# Patient Record
Sex: FEMALE | Race: WHITE | NOT HISPANIC OR LATINO | Employment: UNEMPLOYED | URBAN - METROPOLITAN AREA
[De-identification: names, ages, dates, MRNs, and addresses within clinical notes are randomized per-mention and may not be internally consistent; named-entity substitution may affect disease eponyms.]

---

## 2017-03-01 ENCOUNTER — ALLSCRIPTS OFFICE VISIT (OUTPATIENT)
Dept: OTHER | Facility: OTHER | Age: 27
End: 2017-03-01

## 2017-03-01 DIAGNOSIS — Z11.3 ENCOUNTER FOR SCREENING FOR INFECTIONS WITH PREDOMINANTLY SEXUAL MODE OF TRANSMISSION: ICD-10-CM

## 2017-03-01 LAB
BACTERIA UR QL AUTO: NEGATIVE
CLUE CELL (HISTORICAL): NEGATIVE
HYPHAL YEAST (HISTORICAL): POSITIVE
KOH PREP (HISTORICAL): NEGATIVE
PH UR STRIP.AUTO: NORMAL [PH]
TRICHOMONAS (HISTORICAL): NEGATIVE

## 2017-03-01 PROCEDURE — 87591 N.GONORRHOEAE DNA AMP PROB: CPT | Performed by: OBSTETRICS & GYNECOLOGY

## 2017-03-01 PROCEDURE — 87624 HPV HI-RISK TYP POOLED RSLT: CPT | Performed by: OBSTETRICS & GYNECOLOGY

## 2017-03-01 PROCEDURE — G0145 SCR C/V CYTO,THINLAYER,RESCR: HCPCS | Performed by: OBSTETRICS & GYNECOLOGY

## 2017-03-01 PROCEDURE — 87491 CHLMYD TRACH DNA AMP PROBE: CPT | Performed by: OBSTETRICS & GYNECOLOGY

## 2017-03-02 ENCOUNTER — LAB REQUISITION (OUTPATIENT)
Dept: LAB | Facility: HOSPITAL | Age: 27
End: 2017-03-02
Payer: COMMERCIAL

## 2017-03-02 ENCOUNTER — GENERIC CONVERSION - ENCOUNTER (OUTPATIENT)
Dept: OTHER | Facility: OTHER | Age: 27
End: 2017-03-02

## 2017-03-02 DIAGNOSIS — Z12.4 ENCOUNTER FOR SCREENING FOR MALIGNANT NEOPLASM OF CERVIX: ICD-10-CM

## 2017-03-02 DIAGNOSIS — Z11.3 ENCOUNTER FOR SCREENING FOR INFECTIONS WITH PREDOMINANTLY SEXUAL MODE OF TRANSMISSION: ICD-10-CM

## 2017-03-03 LAB
CHLAMYDIA DNA CVX QL NAA+PROBE: NORMAL
N GONORRHOEA DNA GENITAL QL NAA+PROBE: NORMAL

## 2017-03-06 ENCOUNTER — GENERIC CONVERSION - ENCOUNTER (OUTPATIENT)
Dept: OTHER | Facility: OTHER | Age: 27
End: 2017-03-06

## 2017-03-15 LAB
HPV RRNA GENITAL QL NAA+PROBE: ABNORMAL
LAB AP GYN PRIMARY INTERPRETATION: NORMAL
Lab: NORMAL
PATH INTERP SPEC-IMP: NORMAL

## 2017-03-17 ENCOUNTER — GENERIC CONVERSION - ENCOUNTER (OUTPATIENT)
Dept: OTHER | Facility: OTHER | Age: 27
End: 2017-03-17

## 2017-03-20 ENCOUNTER — GENERIC CONVERSION - ENCOUNTER (OUTPATIENT)
Dept: OTHER | Facility: OTHER | Age: 27
End: 2017-03-20

## 2017-04-07 ENCOUNTER — ALLSCRIPTS OFFICE VISIT (OUTPATIENT)
Dept: OTHER | Facility: OTHER | Age: 27
End: 2017-04-07

## 2017-04-07 ENCOUNTER — LAB REQUISITION (OUTPATIENT)
Dept: LAB | Facility: HOSPITAL | Age: 27
End: 2017-04-07
Payer: COMMERCIAL

## 2017-04-07 DIAGNOSIS — R87.610 ATYPICAL SQUAMOUS CELLS OF UNDETERMINED SIGNIFICANCE ON CYTOLOGIC SMEAR OF CERVIX (ASC-US): ICD-10-CM

## 2017-04-07 PROCEDURE — 88305 TISSUE EXAM BY PATHOLOGIST: CPT | Performed by: OBSTETRICS & GYNECOLOGY

## 2018-01-09 NOTE — MISCELLANEOUS
Message   Recorded as Task   Date: 03/04/2017 07:08 AM, Created By: Kayla Beckman   Task Name: Miscellaneous   Assigned To: [facility]   Regarding Patient: Chidi Gardiner, Status: Active   CommentShelby Public - 04 Mar 2017 7:08 AM     TASK CREATED  VERONA/chloé neg, please inform pt   Yemi Soria - 06 Mar 2017 9:08 AM     TASK EDITED  patient aware        Active Problems    1  Anxiety (300 00) (F41 9)   2  Birth control counseling (V25 09) (Z30 09)   3  Cannabis Use (305 20)   4  Cervical cancer screening (V76 2) (Z12 4)   5  Depression (311) (F32 9)   6  Disc degeneration, lumbar (722 52) (M51 36)   7  Encounter for gynecological examination (V72 31) (Z01 419)   8  Esophageal reflux (530 81) (K21 9)   9  Herniated nucleus pulposus, L4-5 (722 10) (M51 26)   10  IUD contraception (V45 51) (Z97 5)   11  Long term current use of opiate analgesic (V58 69) (Z79 891)   12  Lumbago With Sciatica (724 3)   13  Denied: History of Patient Education - Self-Examination Of Breasts   14  Sacral Radiculopathy At S1 (724 4)   15  Sacroiliitis (720 2) (M46 1)   16  Screening for STDs (sexually transmitted diseases) (V74 5) (Z11 3)   17  Secondary amenorrhea (626 0) (N91 1)   18  Vaginitis due to Candida albicans (112 1) (B37 3)    Current Meds   1  Fluconazole 200 MG Oral Tablet; TAKE 1 TABLET 1 TIME ONLY  May repeat in one week   if still symptomatic; Therapy: [id number] to (Evaluate:03Mar2017)  Requested for: 52POM0918; Last   Rx:01Mar2017 Ordered   2  Mirena (52 MG) 20 MCG/24HR Intrauterine Intrauterine Device Recorded    Allergies    1  No Known Drug Allergies    2  No Known Environmental Allergies   3   No Known Food Allergies    Signatures   Electronically signed by : Yuniel Del Cid, ; Mar  6 2017 10:31AM EST                       (Author)

## 2018-01-10 NOTE — MISCELLANEOUS
Message   Recorded as Task   Date: 03/01/2017 02:41 PM, Created By: Nirmal Rodriguez   Task Name: Miscellaneous   Assigned To: Danay Valdes   Regarding Patient: Shruthi Reynoso, Status: In Progress   CommentEstivenarsh Alanis - 01 Mar 2017 2:41 PM     TASK CREATED  Patient is interested in [place] [address] IUD removal with reinsertion  It was placed 3/8/12  She has a history of abnormal Pap smear  Pap was done today  If normal, we can arrange for removal with reinsertion soon  If abnormal Pap, would suggest colposcopy first to see what the abnormalities on the cervix  Then could arrange for IUD removal with reinsertion after that  Additionally, patient with anxiety disorder, did tell her that I could give her 0 5 mg Ativan prior to the insertion process in addition to ibuprofen  She left before I could give her that prescription   [facility] - 02 Mar 2017 8:18 AM     TASK EDITED  Iud insertion and removal would be covered under plan  No ded or oop  Lm for pt   [facility] - 02 Mar 2017 8:18 AM     TASK IN PROGRESS   [facility] - 58 Mar 2017 8:42 AM     TASK EDITED  Spoke to patient, she is aware of benefits  We will wait for pap results and then schedule her accordingly  She does wish to have new iud replaced at removal time  Active Problems    1  Anxiety (300 00) (F41 9)   2  Birth control counseling (V25 09) (Z30 09)   3  Cannabis Use (305 20)   4  Cervical cancer screening (V76 2) (Z12 4)   5  Depression (311) (F32 9)   6  Disc degeneration, lumbar (722 52) (M51 36)   7  Encounter for gynecological examination (V72 31) (Z01 419)   8  Esophageal reflux (530 81) (K21 9)   9  Herniated nucleus pulposus, L4-5 (722 10) (M51 26)   10  IUD contraception (V45 51) (Z97 5)   11  Long term current use of opiate analgesic (V58 69) (Z79 891)   12  Lumbago With Sciatica (724 3)   13  Denied: History of Patient Education - Self-Examination Of Breasts   14  Sacral Radiculopathy At S1 (724 4)   15   Sacroiliitis (720 2) (M46 1)   16  Screening for STDs (sexually transmitted diseases) (V74 5) (Z11 3)   17  Secondary amenorrhea (626 0) (N91 1)   18  Vaginitis due to Candida albicans (112 1) (B37 3)    Current Meds   1  Fluconazole 200 MG Oral Tablet; TAKE 1 TABLET 1 TIME ONLY  May repeat in one week   if still symptomatic; Therapy: [id number] to (Evaluate:03Mar2017)  Requested for: 38KYZ8568; Last   Rx:01Mar2017 Ordered   2  Mirena (52 MG) 20 MCG/24HR Intrauterine Intrauterine Device Recorded    Allergies    1  No Known Drug Allergies    2  No Known Environmental Allergies   3  No Known Food Allergies    Signatures   Electronically signed by :  Enola Gaucher, ; Mar  2 2017  8:43AM EST                       (Author)

## 2018-01-12 NOTE — MISCELLANEOUS
Message   Recorded as Task   Date: 03/16/2017 07:48 AM, Created By: Tien Riggins   Task Name: Miscellaneous   Assigned To: Victoria Harrison   Regarding Patient: Mauricio Washington, Status: In Progress   CommentOrterrell Pederson - 16 Mar 2017 7:48 AM     TASK CREATED  Pap with ASCUS, positive HPV  Needs colposcopy  Would suggest we proceed with colposcopy first and then we will deal with removal and reinsertion of Mirena after those findings are back  Bradley Castroa - 17 Mar 2017 10:47 AM     TASK IN PROGRESS   Joselyn Castro - 17 Mar 2017 10:55 AM     TASK REPLIED TO: Previously Assigned To [employer] had reception look for an apt for the colpo    pt already had her IUD appt    they may have to change that to the colpo  Isra Salmon - 17 Mar 2017 3:39 PM     TASK REPLIED TO: Previously Assigned To [employer], would do colposcopy first followed by IUD after that depending on colposcopy results  Active Problems    1  Anxiety (300 00) (F41 9)   2  Birth control counseling (V25 09) (Z30 09)   3  Cannabis Use (305 20)   4  Cervical cancer screening (V76 2) (Z12 4)   5  Depression (311) (F32 9)   6  Disc degeneration, lumbar (722 52) (M51 36)   7  Encounter for gynecological examination (V72 31) (Z01 419)   8  Esophageal reflux (530 81) (K21 9)   9  Herniated nucleus pulposus, L4-5 (722 10) (M51 26)   10  IUD contraception (V45 51) (Z97 5)   11  Long term current use of opiate analgesic (V58 69) (Z79 891)   12  Lumbago With Sciatica (724 3)   13  Denied: History of Patient Education - Self-Examination Of Breasts   14  Sacral Radiculopathy At S1 (724 4)   15  Sacroiliitis (720 2) (M46 1)   16  Screening for STDs (sexually transmitted diseases) (V74 5) (Z11 3)   17  Secondary amenorrhea (626 0) (N91 1)   18  Vaginitis due to Candida albicans (112 1) (B37 3)    Current Meds   1  Fluconazole 200 MG Oral Tablet; TAKE 1 TABLET 1 TIME ONLY   May repeat in one week   if still symptomatic; Therapy: 47TAU1803 to (Evaluate:03Mar2017)  Requested for: 75VVD5004; Last   Rx:01Mar2017 Ordered   2  LORazepam 0 5 MG Oral Tablet; TAKE 1 TABLET EVERY 6 HOURS AS NEEDED; Therapy: 24OIU0167 to (Evaluate:07Mar2017); Last Rx:06Mar2017 Ordered   3  Mirena (52 MG) 20 MCG/24HR Intrauterine Intrauterine Device Recorded    Allergies    1  No Known Drug Allergies    2  No Known Environmental Allergies   3   No Known Food Allergies    Signatures   Electronically signed by : Griselda Shiley, ; Mar 20 2017  8:36AM EST                       (Author)

## 2018-01-13 VITALS
SYSTOLIC BLOOD PRESSURE: 120 MMHG | HEIGHT: 60 IN | DIASTOLIC BLOOD PRESSURE: 66 MMHG | WEIGHT: 157.13 LBS | BODY MASS INDEX: 30.85 KG/M2

## 2018-01-14 VITALS
WEIGHT: 158.13 LBS | SYSTOLIC BLOOD PRESSURE: 120 MMHG | DIASTOLIC BLOOD PRESSURE: 66 MMHG | HEIGHT: 60 IN | BODY MASS INDEX: 31.05 KG/M2

## 2018-01-15 NOTE — MISCELLANEOUS
Message   Recorded as Task   Date: 03/16/2017 07:48 AM, Created By: Scar Fried   Task Name: Miscellaneous   Assigned To: Isra Virk   Regarding Patient: Jovanny Clancy, Status: In Progress   CommentLorri Reza - 16 Mar 2017 7:48 AM     TASK CREATED  Pap with ASCUS, positive HPV  Needs colposcopy  Would suggest we proceed with colposcopy first and then we will deal with removal and reinsertion of Mirena after those findings are back  Joselyn Castro - 17 Mar 2017 10:47 AM     TASK IN PROGRESS   Joselyn Castro - 17 Mar 2017 10:55 AM     TASK REPLIED TO: Previously Assigned To [employer] had reception look for an apt for the colpo    pt already had her IUD appt    they may have to change that to the colpo           Active Problems    1  Anxiety (300 00) (F41 9)   2  Birth control counseling (V25 09) (Z30 09)   3  Cannabis Use (305 20)   4  Cervical cancer screening (V76 2) (Z12 4)   5  Depression (311) (F32 9)   6  Disc degeneration, lumbar (722 52) (M51 36)   7  Encounter for gynecological examination (V72 31) (Z01 419)   8  Esophageal reflux (530 81) (K21 9)   9  Herniated nucleus pulposus, L4-5 (722 10) (M51 26)   10  IUD contraception (V45 51) (Z97 5)   11  Long term current use of opiate analgesic (V58 69) (Z79 891)   12  Lumbago With Sciatica (724 3)   13  Denied: History of Patient Education - Self-Examination Of Breasts   14  Sacral Radiculopathy At S1 (724 4)   15  Sacroiliitis (720 2) (M46 1)   16  Screening for STDs (sexually transmitted diseases) (V74 5) (Z11 3)   17  Secondary amenorrhea (626 0) (N91 1)   18  Vaginitis due to Candida albicans (112 1) (B37 3)    Current Meds   1  Fluconazole 200 MG Oral Tablet; TAKE 1 TABLET 1 TIME ONLY  May repeat in one week   if still symptomatic; Therapy: 96VDM1987 to (Evaluate:03Mar2017)  Requested for: 47UBU1610; Last   Rx:01Mar2017 Ordered   2  LORazepam 0 5 MG Oral Tablet; TAKE 1 TABLET EVERY 6 HOURS AS NEEDED;    Therapy: 93BMI3496 to (Evaluate:07Mar2017); Last Rx:06Mar2017 Ordered   3  Mirena (52 MG) 20 MCG/24HR Intrauterine Intrauterine Device Recorded    Allergies    1  No Known Drug Allergies    2  No Known Environmental Allergies   3   No Known Food Allergies    Signatures   Electronically signed by : Lemuel Mart, ; Mar 17 2017 10:55AM EST                       (Author)

## 2018-05-02 ENCOUNTER — PROCEDURE VISIT (OUTPATIENT)
Dept: OBGYN CLINIC | Facility: CLINIC | Age: 28
End: 2018-05-02
Payer: COMMERCIAL

## 2018-05-02 VITALS
SYSTOLIC BLOOD PRESSURE: 120 MMHG | HEIGHT: 60 IN | BODY MASS INDEX: 29.25 KG/M2 | WEIGHT: 149 LBS | DIASTOLIC BLOOD PRESSURE: 60 MMHG

## 2018-05-02 DIAGNOSIS — Z30.432 ENCOUNTER FOR IUD REMOVAL: ICD-10-CM

## 2018-05-02 DIAGNOSIS — N91.2 AMENORRHEA: Primary | ICD-10-CM

## 2018-05-02 DIAGNOSIS — Z30.430 ENCOUNTER FOR INSERTION OF MIRENA IUD: ICD-10-CM

## 2018-05-02 DIAGNOSIS — Z30.011 ORAL CONTRACEPTION INITIATION: ICD-10-CM

## 2018-05-02 PROBLEM — R87.810 ASCUS WITH POSITIVE HIGH RISK HPV CERVICAL: Status: ACTIVE | Noted: 2017-04-07

## 2018-05-02 PROBLEM — N91.1 SECONDARY AMENORRHEA: Status: ACTIVE | Noted: 2017-03-01

## 2018-05-02 PROBLEM — R87.610 ASCUS WITH POSITIVE HIGH RISK HPV CERVICAL: Status: ACTIVE | Noted: 2017-04-07

## 2018-05-02 LAB — SL AMB POCT URINE HCG: NEGATIVE

## 2018-05-02 PROCEDURE — 99213 OFFICE O/P EST LOW 20 MIN: CPT | Performed by: OBSTETRICS & GYNECOLOGY

## 2018-05-02 PROCEDURE — 81025 URINE PREGNANCY TEST: CPT | Performed by: OBSTETRICS & GYNECOLOGY

## 2018-05-02 PROCEDURE — 58301 REMOVE INTRAUTERINE DEVICE: CPT | Performed by: OBSTETRICS & GYNECOLOGY

## 2018-05-02 RX ORDER — NORETHINDRONE ACETATE AND ETHINYL ESTRADIOL 1; .02 MG/1; MG/1
1 TABLET ORAL DAILY
Qty: 21 TABLET | Refills: 3 | Status: SHIPPED | OUTPATIENT
Start: 2018-05-02 | End: 2020-06-11

## 2018-05-02 RX ORDER — PEDI MULTIVIT NO.91/IRON FUM 15 MG
1 TABLET,CHEWABLE ORAL
COMMUNITY
End: 2020-06-11

## 2018-05-02 RX ORDER — ZINC GLUCONATE 50 MG
50 TABLET ORAL
COMMUNITY
End: 2020-06-11

## 2018-05-02 NOTE — PROGRESS NOTES
Order(s) created erroneously   Erroneous order ID: [id number]   Order canceled by: Velasquez Moura   Order cancel date/time: 05/02/2018 4:42 PM

## 2018-05-02 NOTE — PROGRESS NOTES
Iud removal  Date/Time: 2018 4:33 PM  Performed by: Sonali Aldrich  Authorized by: Sonali Aldrich     Consent:     Consent obtained:  Verbal and written    Consent given by:  Patient    Procedure risks and benefits discussed: yes      Patient questions answered: yes      Patient agrees, verbalizes understanding, and wants to proceed: yes      Educational handouts given: yes      Instructions and paperwork completed: yes    Procedure:     Removed with no complications: yes      Other reason for removal:   IUD  Comments:      Patient with incredible anxiety during the entire process    Between myself and her boyfriend, we were able to come her down to allow the speculum insertion and prompt IUD removal

## 2018-05-02 NOTE — PROGRESS NOTES
Assessment/Plan:  1  Mirena IUD removal-done without problems  Of note, the IUD was placed 3/8/12  Urine pregnancy test today was negative  The patient had planned Mirena IUD Re insertion, but she declined to do it today secondary to concerns about pain  She will start OCP instead  2   Contraception-patient wished to start OCP  She was on OCP previously without issues  The patient was counseled about risks of birth control pills  She denies any contraindications, including risks of blood clots, liver disease, hormone dependent tumors, or hypertension  ACHES symptoms were discussed  All questions were answered  The patient will start the pill as directed, and return in 3 months time for pill check  She does note migraine headaches, but denies any visual changes or neurologic changes  She is aware of potential risk of vascular complications such as stroke related to combined OCP  Electronic prescription for Loestrin 1/20, 1 pack to be refilled at 21 day intervals for continuous use was sent a local pharmacy, refill 3  She will follow up in 2-3 months time for pill check  3   History of Pap with ASCUS/positive HPV-patient had follow-up colposcopy with changes suggestive of HPV  She will follow up in the near future for yearly exam with Pap test   We could do pill check at that time depending on the timing  4   History of yeast infection-resolved  5  STD concerns-patient with new partner since January 2018  She declines STD testing at this time  6   Secondary amenorrhea-related to Mirena IUD  She will hopes to have secondary amenorrhea related to continuous OCP use going forward  7  Anxiety-patient with incredible levels of anxiety even related to IUD removal     Visit greater than 30 minutes duration above IUD removal, with greater than 50% of time spent counseling and coordinating care   She will follow up in the near future for yearly exam with Pap        No problem-specific Assessment & Plan notes found for this encounter  Diagnoses and all orders for this visit:    Amenorrhea  -     POCT urine HCG    Encounter for insertion of mirena IUD  -     POCT urine HCG    Encounter for IUD removal    Oral contraception initiation  -     norethindrone-ethinyl estradiol (MICROGESTIN 1/20) 1-20 MG-MCG per tablet; Take 1 tablet by mouth daily Patient taking continuously, please refill at 21 day intervals    Other orders  -     ascorbic acid (VITAMIN C) 250 MG CHEW; Chew 1 tablet  -     VITAMIN B COMPLEX-C PO; Take 1 tablet by mouth  -     Cholecalciferol 1000 units CHEW; Chew 1 tablet  -     levonorgestrel (MIRENA) 20 MCG/24HR IUD; 1 each by Intrauterine route  -     pediatric multivitamin-iron (POLY-VI-SOL with IRON) 15 MG chewable tablet; Chew 1 tablet  -     zinc gluconate 50 mg tablet; Take 50 mg by mouth          Subjective:      Patient ID: Willy Ford is a 32 y o  female  Patient was seen today for IUD removal   She had planned IUD Re insertion but changed her mind at the time of the visit  The following portions of the patient's history were reviewed and updated as appropriate: allergies, current medications, past family history, past medical history, past social history, past surgical history and problem list     Review of Systems   Constitutional: Negative for chills, diaphoresis, fatigue and fever  Respiratory: Negative for apnea, cough, chest tightness, shortness of breath and wheezing  Cardiovascular: Negative for chest pain, palpitations and leg swelling  Gastrointestinal: Negative for abdominal distention, abdominal pain, anal bleeding, constipation, diarrhea, nausea, rectal pain and vomiting  Genitourinary: Negative for difficulty urinating, dyspareunia, dysuria, frequency, hematuria, menstrual problem, pelvic pain, urgency, vaginal bleeding, vaginal discharge and vaginal pain  Musculoskeletal: Negative for arthralgias, back pain and myalgias     Skin: Negative for color change and rash  Neurological: Negative for dizziness, syncope, light-headedness, numbness and headaches  Hematological: Negative for adenopathy  Does not bruise/bleed easily  Psychiatric/Behavioral: Negative for dysphoric mood and sleep disturbance  The patient is not nervous/anxious  Objective:      /60 (BP Location: Left arm, Patient Position: Sitting, Cuff Size: Standard)   Ht 5' (1 524 m)   Wt 67 6 kg (149 lb)   BMI 29 10 kg/m²          Physical Exam    Objective      /60 (BP Location: Left arm, Patient Position: Sitting, Cuff Size: Standard)   Ht 5' (1 524 m)   Wt 67 6 kg (149 lb)   BMI 29 10 kg/m²     General:   alert and oriented, in no acute distress, alert, appears stated age and cooperative   Neck: normal to inspection and palpation   Breast:    Heart:    Lungs:    Abdomen: soft, non-tender, without masses or organomegaly   Vulva: normal   Vagina: Without lesions or discharge noted  No erythema noted   Cervix: Without lesions or discharge or cervicitis  No Cervical motion tenderness    IUD string was seen at a length of 2 cm and IUD was removed without problems as documented   Uterus: top normal size, anteverted, non-tender   Adnexa: no mass, fullness, tenderness   Rectum: deferred

## 2018-09-15 DIAGNOSIS — Z30.011 ORAL CONTRACEPTION INITIATION: ICD-10-CM

## 2018-09-17 RX ORDER — NORETHINDRONE ACETATE AND ETHINYL ESTRADIOL 1; 20 MG/1; UG/1
1 TABLET ORAL DAILY
Qty: 21 TABLET | Refills: 2 | OUTPATIENT
Start: 2018-09-17

## 2019-10-20 ENCOUNTER — HOSPITAL ENCOUNTER (EMERGENCY)
Facility: HOSPITAL | Age: 29
Discharge: HOME | End: 2019-10-20
Attending: EMERGENCY MEDICINE
Payer: COMMERCIAL

## 2019-10-20 VITALS
HEART RATE: 72 BPM | BODY MASS INDEX: 34.18 KG/M2 | RESPIRATION RATE: 14 BRPM | OXYGEN SATURATION: 99 % | SYSTOLIC BLOOD PRESSURE: 97 MMHG | WEIGHT: 175 LBS | TEMPERATURE: 97.4 F | DIASTOLIC BLOOD PRESSURE: 61 MMHG

## 2019-10-20 DIAGNOSIS — F15.10 METHAMPHETAMINE ABUSE (HCC): ICD-10-CM

## 2019-10-20 DIAGNOSIS — T40.1X1A ACCIDENTAL OVERDOSE OF HEROIN, INITIAL ENCOUNTER (HCC): Primary | ICD-10-CM

## 2019-10-20 LAB
ATRIAL RATE: 258 BPM
QRS AXIS: 78 DEGREES
QRSD INTERVAL: 88 MS
QT INTERVAL: 358 MS
QTC INTERVAL: 449 MS
T WAVE AXIS: 3 DEGREES
VENTRICULAR RATE: 95 BPM

## 2019-10-20 PROCEDURE — 99285 EMERGENCY DEPT VISIT HI MDM: CPT

## 2019-10-20 PROCEDURE — 93010 ELECTROCARDIOGRAM REPORT: CPT | Performed by: INTERNAL MEDICINE

## 2019-10-20 PROCEDURE — 96372 THER/PROPH/DIAG INJ SC/IM: CPT

## 2019-10-20 PROCEDURE — 96374 THER/PROPH/DIAG INJ IV PUSH: CPT

## 2019-10-20 PROCEDURE — 99284 EMERGENCY DEPT VISIT MOD MDM: CPT | Performed by: EMERGENCY MEDICINE

## 2019-10-20 PROCEDURE — 93005 ELECTROCARDIOGRAM TRACING: CPT

## 2019-10-20 RX ORDER — HALOPERIDOL 5 MG/ML
5 INJECTION INTRAMUSCULAR ONCE
Status: COMPLETED | OUTPATIENT
Start: 2019-10-20 | End: 2019-10-20

## 2019-10-20 RX ORDER — ONDANSETRON 2 MG/ML
4 INJECTION INTRAMUSCULAR; INTRAVENOUS ONCE
Status: COMPLETED | OUTPATIENT
Start: 2019-10-20 | End: 2019-10-20

## 2019-10-20 RX ORDER — ONDANSETRON 2 MG/ML
INJECTION INTRAMUSCULAR; INTRAVENOUS
Status: COMPLETED
Start: 2019-10-20 | End: 2019-10-20

## 2019-10-20 RX ORDER — ONDANSETRON 2 MG/ML
1 INJECTION INTRAMUSCULAR; INTRAVENOUS ONCE
Status: COMPLETED | OUTPATIENT
Start: 2019-10-20 | End: 2019-10-20

## 2019-10-20 RX ADMIN — ONDANSETRON 4 MG: 2 INJECTION INTRAMUSCULAR; INTRAVENOUS at 08:59

## 2019-10-20 RX ADMIN — HALOPERIDOL LACTATE 5 MG: 5 INJECTION, SOLUTION INTRAMUSCULAR at 10:01

## 2019-10-20 NOTE — ED ATTENDING ATTESTATION
10/20/2019  I, Monique Matos MD, saw and evaluated the patient  I have discussed the patient with the resident/non-physician practitioner and agree with the resident's/non-physician practitioner's findings, Plan of Care, and MDM as documented in the resident's/non-physician practitioner's note, except where noted  All available labs and Radiology studies were reviewed  I was present for key portions of any procedure(s) performed by the resident/non-physician practitioner and I was immediately available to provide assistance  At this point I agree with the current assessment done in the Emergency Department    I have conducted an independent evaluation of this patient a history and physical is as follows:  Snorted heroin and meth   Ems called no narcan given    No c/o pain no fever no ha    Has nausea and vomiting   No abdominal pain no headache  Patient is not suicidal or homicidal  Exam:  Vital signs are stable HEENT exam normocephalic atraumatic pupils are round react to light sclerae anicteric lungs clear heart regular abdomen soft nontender extremities nontender neurologic exam moves all extremities purposely face is symmetric awake alert oriented x3  Impression heroin ingestion with man them phentermine ingestion  ED Course         Critical Care Time  Procedures

## 2019-10-20 NOTE — ED PROVIDER NOTES
History  Chief Complaint   Patient presents with    Heroin Overdose - Accidental     OD on snorting heroin  also admits to using meth  pt found unresponsive in park  Pt woke up on her own when EMS arrived (no narcan needed)  HPI   49-year-old woman presents to the emergency department after heroin overdose  Patient was found in a park unresponsive  By time of EMS arrival patient was awake and alert  No Narcan was given  Patient admits to snorting heroin and methamphetamine  Denies injecting drugs  She has a history of drug abuse, says she was clean for several months but recently started using again  Denies ingesting any other substances including alcohol, acetaminophen, or aspirin  Denies this was an attempt to harm herself  States that she did purchase this heroin from a new dealer and believes this may be why she overdosed  She is unable to quantify the exact amount of heroin and meth that she has used  She denies any pain or trauma  Currently her only complaint is nausea and jitteryness  Prior to Admission Medications   Prescriptions Last Dose Informant Patient Reported? Taking?    Cholecalciferol 1000 units CHEW   Yes Yes   Sig: Chew 1 tablet   VITAMIN B COMPLEX-C PO   Yes Yes   Sig: Take 1 tablet by mouth   ascorbic acid (VITAMIN C) 250 MG CHEW   Yes Yes   Sig: Chew 1 tablet   levonorgestrel (MIRENA) 20 MCG/24HR IUD   Yes Yes   Si each by Intrauterine route   norethindrone-ethinyl estradiol (MICROGESTIN ) 1-20 MG-MCG per tablet   No Yes   Sig: Take 1 tablet by mouth daily Patient taking continuously, please refill at 21 day intervals   pediatric multivitamin-iron (POLY-VI-SOL with IRON) 15 MG chewable tablet   Yes Yes   Sig: Chew 1 tablet   zinc gluconate 50 mg tablet   Yes Yes   Sig: Take 50 mg by mouth      Facility-Administered Medications: None       Past Medical History:   Diagnosis Date    Depression     Fetal macrosomia     Human papilloma virus (HPV) infection     Last Assessed: 2013    Mild cervical dysplasia     Pap Smear (+) Low Grade Squamous Intraepithelial Lesion 2013    with colposcopy Dec  2013 with negative biopsies except for parakeratosis    Postcoital bleeding     Last Assessed: 2013       Past Surgical History:   Procedure Laterality Date     SECTION  2010    primary low transverse  for arrest of dilation at term on 7/9/10  Patient had live male Apgars  with weight 9 lbs  1 oz   COLPOSCOPY W/ BIOPSY / CURETTAGE  2013    12/3/13 patient with colposcopy wtih ECCs ad biopsies at 12  All biopsies were negative with parakeratosis at 12       Family History   Problem Relation Age of Onset    Cancer Family     Diabetes Family     Hypertension Family     Thyroid disease Family         Disorder    Other Family         Back pain     I have reviewed and agree with the history as documented  Social History     Tobacco Use    Smoking status: Current Every Day Smoker     Packs/day: 1 00     Types: Cigarettes    Smokeless tobacco: Never Used   Substance Use Topics    Alcohol use: Yes     Comment: social drinker per [employer]    Drug use: Yes     Types: Heroin, Methamphetamines        Review of Systems   Constitutional: Negative for chills and fever  Respiratory: Negative for shortness of breath  Cardiovascular: Negative for chest pain  Gastrointestinal: Positive for nausea and vomiting  Negative for abdominal pain  Psychiatric/Behavioral: Positive for agitation  Negative for suicidal ideas  All other systems reviewed and are negative        Physical Exam  ED Triage Vitals   Temperature Pulse Respirations Blood Pressure SpO2   10/20/19 0924 10/20/19 0849 10/20/19 0849 10/20/19 0854 10/20/19 0849   (!) 94 9 °F (34 9 °C) 102 18 143/64 98 %      Temp Source Heart Rate Source Patient Position - Orthostatic VS BP Location FiO2 (%)   10/20/19 0924 10/20/19 0849 10/20/19 1330 10/20/19 1330 --   Rectal Monitor Lying Right arm       Pain Score       10/20/19 0849       No Pain             Orthostatic Vital Signs  Vitals:    10/20/19 1230 10/20/19 1300 10/20/19 1330 10/20/19 1400   BP: 94/55 94/52 96/51 97/61   Pulse: 80 74 74 72   Patient Position - Orthostatic VS:   Lying        Physical Exam   Constitutional: She is oriented to person, place, and time  She appears well-developed and well-nourished  No distress  Shaky, crying  HENT:   Head: Normocephalic and atraumatic  Eyes: Pupils are equal, round, and reactive to light  Conjunctivae and EOM are normal  No scleral icterus  Neck: Normal range of motion  Neck supple  Cardiovascular: Regular rhythm  Exam reveals no gallop and no friction rub  No murmur heard  Tachycardic  Pulmonary/Chest: Breath sounds normal  She has no wheezes  She has no rales  Mouth breathing  Abdominal: Soft  She exhibits no distension  There is no tenderness  There is no rebound and no guarding  Musculoskeletal: Normal range of motion  She exhibits no edema or tenderness  Neurological: She is alert and oriented to person, place, and time  No cranial nerve deficit or sensory deficit  She exhibits normal muscle tone  Skin: Skin is warm and dry  She is not diaphoretic  No erythema  No pallor  Psychiatric:   Anxious  Difficult to console  Nursing note and vitals reviewed        ED Medications  Medications   ondansetron (FOR EMS ONLY) (ZOFRAN) 4 mg/2 mL injection 4 mg (0 mg Does not apply Given to EMS 10/20/19 0859)   ondansetron (ZOFRAN) injection 4 mg (4 mg Intravenous Given 10/20/19 0859)   haloperidol lactate (HALDOL) injection 5 mg (5 mg Intramuscular Given 10/20/19 1001)       Diagnostic Studies  Results Reviewed     None                 No orders to display         Procedures  ECG 12 Lead Documentation Only  Date/Time: 10/20/2019 8:53 AM  Performed by: Kassandra Munguia MD  Authorized by: Kassandra Munguia MD     Indications / Diagnosis:  Overdose  ECG reviewed by me, the ED Provider: yes    Patient location:  ED  Previous ECG:     Previous ECG:  Unavailable    Comparison to cardiac monitor: Yes    Interpretation:     Interpretation: non-specific    Quality:     Tracing quality:  Limited by artifact  Rate:     ECG rate:  95    ECG rate assessment: normal    Rhythm:     Rhythm: sinus rhythm    Ectopy:     Ectopy: none    QRS:     QRS axis:  Normal    QRS intervals:  Normal  Conduction:     Conduction: normal    ST segments:     ST segments:  Non-specific  T waves:     T waves: non-specific    Comments:      Heavy artifact especially in limb leads  ED Course  ED Course as of Oct 20 1609   Sun Oct 20, 2019   1030 Left message with HOST      1340 Reassessed patient and she is able to have conversation  Will have HOST come back to speak with patient  MDM  Number of Diagnoses or Management Options  Accidental overdose of heroin, initial encounter [facility]): new and requires workup  Methamphetamine abuse [facility]): new and requires workup     Amount and/or Complexity of Data Reviewed  Discuss the patient with other providers: yes  Independent visualization of images, tracings, or specimens: yes    Patient Progress  Patient progress: resolved     24-year-old woman presents after heroin and methamphetamine overdose  Patient is awake and alert, protecting airway  She does have nausea and vomiting  Will give antiemetics  Will reassess when sober  Patient reassessed and continues to have shakiness, nausea, and vomiting  She was given 5 mg of IM Haldol  Discussed patient with HOST, who came and evaluated her in the emergency department  After their arrival patient says she already has outpatient resources for substance abuse and denies any further needs at this point  Patient reassessed again and is doing well  Nausea and vomiting have resolved  She is at baseline mental status    She has spoken with her father, who will be picking her up from the emergency department  Encouraged patient to follow up with outpatient providers regarding her drug abuse  Disposition  Final diagnoses:   Accidental overdose of heroin, initial encounter [facility])   Methamphetamine abuse ([facility] [address] )     Time reflects when diagnosis was documented in both MDM as applicable and the Disposition within this note     Time User Action Codes Description Comment    10/20/2019  9:21 AM Marlen Doe Add [T40 1X1A] Accidental overdose of heroin, initial encounter ([facility] [address] )     10/20/2019  9:21 AM Marlen Zavaletaus Add [F15 10] Methamphetamine abuse [facility])       ED Disposition     ED Disposition Condition Date/Time Comment    Discharge Good Sun Oct 20, 2019  3:09 PM Astrid Ramirez discharge to home/self care              Follow-up Information     Follow up With Specialties Details Why Contact Info Additional Information    Felix Chopra MD Family Medicine Schedule an appointment as soon as possible for a visit   [address] Emergency Department Emergency Medicine  As needed [address] [place] [address], [address] [place] [address], [facility]   [phone]          Discharge Medication List as of 10/20/2019  3:24 PM      CONTINUE these medications which have NOT CHANGED    Details   ascorbic acid (VITAMIN C) 250 MG CHEW Chew 1 tablet, Historical Med      Cholecalciferol 1000 units CHEW Chew 1 tablet, Historical Med      levonorgestrel (MIRENA) 20 MCG/24HR IUD 1 each by Intrauterine route, Historical Med      norethindrone-ethinyl estradiol (MICROGESTIN 1/20) 1-20 MG-MCG per tablet Take 1 tablet by mouth daily Patient taking continuously, please refill at 21 day intervals, Starting Wed 5/2/2018, Normal      pediatric multivitamin-iron (POLY-VI-SOL with IRON) 15 MG chewable tablet Chew 1 tablet, Historical Med      VITAMIN B COMPLEX-C PO Take 1 tablet by mouth, Historical Med zinc gluconate 50 mg tablet Take 50 mg by mouth, Historical Med           No discharge procedures on file  ED Provider  Attending physically available and evaluated Shwetha Lucas I managed the patient along with the ED Attending      Electronically Signed by         Sharyle Lora, MD  10/20/19 3645

## 2019-10-20 NOTE — ED NOTES
Patient told HOST she does not want rehab, she states she has outpatient resources and that she wants to go home  I went in to speak with the patient myself and she told me the same       Kojo Masters RN  10/20/19 4823

## 2020-05-06 ENCOUNTER — HOSPITAL ENCOUNTER (EMERGENCY)
Facility: HOSPITAL | Age: 30
Discharge: HOME | End: 2020-05-06
Attending: EMERGENCY MEDICINE | Admitting: EMERGENCY MEDICINE
Payer: COMMERCIAL

## 2020-05-06 VITALS
DIASTOLIC BLOOD PRESSURE: 72 MMHG | HEART RATE: 76 BPM | RESPIRATION RATE: 20 BRPM | SYSTOLIC BLOOD PRESSURE: 120 MMHG | BODY MASS INDEX: 37.11 KG/M2 | OXYGEN SATURATION: 98 % | TEMPERATURE: 97.8 F | WEIGHT: 190 LBS

## 2020-05-06 DIAGNOSIS — N72 CERVICITIS: Primary | ICD-10-CM

## 2020-05-06 LAB
BACTERIA UR QL AUTO: ABNORMAL /HPF
BILIRUB UR QL STRIP: NEGATIVE
CLARITY UR: ABNORMAL
COLOR UR: YELLOW
EXT PREG TEST URINE: NEGATIVE
EXT. CONTROL ED NAV: NORMAL
GLUCOSE UR STRIP-MCNC: NEGATIVE MG/DL
HGB UR QL STRIP.AUTO: NEGATIVE
KETONES UR STRIP-MCNC: NEGATIVE MG/DL
LEUKOCYTE ESTERASE UR QL STRIP: ABNORMAL
NITRITE UR QL STRIP: NEGATIVE
NON-SQ EPI CELLS URNS QL MICRO: ABNORMAL /HPF
PH UR STRIP.AUTO: 6.5 [PH]
PROT UR STRIP-MCNC: NEGATIVE MG/DL
RBC #/AREA URNS AUTO: ABNORMAL /HPF
SP GR UR STRIP.AUTO: 1.02 (ref 1–1.03)
UROBILINOGEN UR QL STRIP.AUTO: 0.2 E.U./DL
WBC #/AREA URNS AUTO: ABNORMAL /HPF

## 2020-05-06 PROCEDURE — 81025 URINE PREGNANCY TEST: CPT | Performed by: EMERGENCY MEDICINE

## 2020-05-06 PROCEDURE — 99283 EMERGENCY DEPT VISIT LOW MDM: CPT

## 2020-05-06 PROCEDURE — 81001 URINALYSIS AUTO W/SCOPE: CPT | Performed by: EMERGENCY MEDICINE

## 2020-05-06 PROCEDURE — 87186 SC STD MICRODIL/AGAR DIL: CPT | Performed by: EMERGENCY MEDICINE

## 2020-05-06 PROCEDURE — 87077 CULTURE AEROBIC IDENTIFY: CPT | Performed by: EMERGENCY MEDICINE

## 2020-05-06 PROCEDURE — 96372 THER/PROPH/DIAG INJ SC/IM: CPT

## 2020-05-06 PROCEDURE — 99284 EMERGENCY DEPT VISIT MOD MDM: CPT | Performed by: EMERGENCY MEDICINE

## 2020-05-06 PROCEDURE — 87086 URINE CULTURE/COLONY COUNT: CPT | Performed by: EMERGENCY MEDICINE

## 2020-05-06 PROCEDURE — 87591 N.GONORRHOEAE DNA AMP PROB: CPT | Performed by: EMERGENCY MEDICINE

## 2020-05-06 PROCEDURE — 87491 CHLMYD TRACH DNA AMP PROBE: CPT | Performed by: EMERGENCY MEDICINE

## 2020-05-06 RX ORDER — AZITHROMYCIN 250 MG/1
1000 TABLET, FILM COATED ORAL ONCE
Status: COMPLETED | OUTPATIENT
Start: 2020-05-06 | End: 2020-05-06

## 2020-05-06 RX ORDER — DOXYCYCLINE HYCLATE 100 MG/1
100 CAPSULE ORAL 2 TIMES DAILY
Qty: 20 CAPSULE | Refills: 0 | Status: SHIPPED | OUTPATIENT
Start: 2020-05-06 | End: 2020-05-16

## 2020-05-06 RX ORDER — DOXYCYCLINE HYCLATE 100 MG/1
100 CAPSULE ORAL ONCE
Status: COMPLETED | OUTPATIENT
Start: 2020-05-06 | End: 2020-05-06

## 2020-05-06 RX ADMIN — DOXYCYCLINE 100 MG: 100 CAPSULE ORAL at 23:00

## 2020-05-06 RX ADMIN — AZITHROMYCIN 1000 MG: 250 TABLET, FILM COATED ORAL at 23:00

## 2020-05-06 RX ADMIN — WATER 250 MG: 1 INJECTION INTRAMUSCULAR; INTRAVENOUS; SUBCUTANEOUS at 23:01

## 2020-05-08 LAB
C TRACH DNA SPEC QL NAA+PROBE: NEGATIVE
N GONORRHOEA DNA SPEC QL NAA+PROBE: NEGATIVE

## 2020-05-09 ENCOUNTER — TELEPHONE (OUTPATIENT)
Dept: EMERGENCY DEPT | Facility: HOSPITAL | Age: 30
End: 2020-05-09

## 2020-05-09 DIAGNOSIS — N39.0 UTI (URINARY TRACT INFECTION): Primary | ICD-10-CM

## 2020-05-09 LAB
BACTERIA UR CULT: ABNORMAL
BACTERIA UR CULT: ABNORMAL

## 2020-05-09 RX ORDER — CEPHALEXIN 500 MG/1
500 CAPSULE ORAL EVERY 12 HOURS SCHEDULED
Qty: 20 CAPSULE | Refills: 0 | Status: SHIPPED | OUTPATIENT
Start: 2020-05-09 | End: 2020-05-19

## 2020-06-11 ENCOUNTER — OFFICE VISIT (OUTPATIENT)
Dept: URGENT CARE | Facility: CLINIC | Age: 30
End: 2020-06-11
Payer: COMMERCIAL

## 2020-06-11 VITALS
HEART RATE: 66 BPM | WEIGHT: 170 LBS | TEMPERATURE: 99.9 F | OXYGEN SATURATION: 99 % | RESPIRATION RATE: 16 BRPM | HEIGHT: 60 IN | SYSTOLIC BLOOD PRESSURE: 136 MMHG | DIASTOLIC BLOOD PRESSURE: 66 MMHG | BODY MASS INDEX: 33.38 KG/M2

## 2020-06-11 DIAGNOSIS — L03.811 CELLULITIS OF HEAD EXCEPT FACE: Primary | ICD-10-CM

## 2020-06-11 PROCEDURE — G0382 LEV 3 HOSP TYPE B ED VISIT: HCPCS | Performed by: PHYSICIAN ASSISTANT

## 2020-06-11 PROCEDURE — 99203 OFFICE O/P NEW LOW 30 MIN: CPT | Performed by: PHYSICIAN ASSISTANT

## 2020-06-11 PROCEDURE — 99283 EMERGENCY DEPT VISIT LOW MDM: CPT | Performed by: PHYSICIAN ASSISTANT

## 2020-06-11 RX ORDER — CEPHALEXIN 500 MG/1
500 CAPSULE ORAL EVERY 12 HOURS SCHEDULED
Qty: 14 CAPSULE | Refills: 0 | Status: SHIPPED | OUTPATIENT
Start: 2020-06-11 | End: 2020-06-18

## 2020-06-11 RX ORDER — BUPRENORPHINE HYDROCHLORIDE AND NALOXONE HYDROCHLORIDE DIHYDRATE 2; .5 MG/1; MG/1
1 TABLET SUBLINGUAL 2 TIMES DAILY
COMMUNITY
End: 2020-10-23 | Stop reason: ALTCHOICE

## 2020-06-11 RX ORDER — VENLAFAXINE 75 MG/1
75 TABLET ORAL 2 TIMES DAILY
COMMUNITY
End: 2020-10-23 | Stop reason: ALTCHOICE

## 2020-06-12 ENCOUNTER — ANNUAL EXAM (OUTPATIENT)
Dept: OBGYN CLINIC | Facility: CLINIC | Age: 30
End: 2020-06-12
Payer: COMMERCIAL

## 2020-06-12 VITALS
HEIGHT: 60 IN | SYSTOLIC BLOOD PRESSURE: 140 MMHG | DIASTOLIC BLOOD PRESSURE: 80 MMHG | WEIGHT: 183 LBS | BODY MASS INDEX: 35.93 KG/M2

## 2020-06-12 DIAGNOSIS — Z30.011 ENCOUNTER FOR INITIAL PRESCRIPTION OF CONTRACEPTIVE PILLS: ICD-10-CM

## 2020-06-12 DIAGNOSIS — Z30.09 BIRTH CONTROL COUNSELING: ICD-10-CM

## 2020-06-12 DIAGNOSIS — L03.811 CELLULITIS OF HEAD EXCEPT FACE: ICD-10-CM

## 2020-06-12 DIAGNOSIS — R87.810 ASCUS WITH POSITIVE HIGH RISK HPV CERVICAL: Primary | ICD-10-CM

## 2020-06-12 DIAGNOSIS — R87.610 ASCUS WITH POSITIVE HIGH RISK HPV CERVICAL: Primary | ICD-10-CM

## 2020-06-12 PROCEDURE — 87624 HPV HI-RISK TYP POOLED RSLT: CPT | Performed by: OBSTETRICS & GYNECOLOGY

## 2020-06-12 PROCEDURE — G0145 SCR C/V CYTO,THINLAYER,RESCR: HCPCS | Performed by: OBSTETRICS & GYNECOLOGY

## 2020-06-12 PROCEDURE — 99214 OFFICE O/P EST MOD 30 MIN: CPT | Performed by: OBSTETRICS & GYNECOLOGY

## 2020-06-12 RX ORDER — NORETHINDRONE ACETATE AND ETHINYL ESTRADIOL AND FERROUS FUMARATE 1MG-20(24)
1 KIT ORAL DAILY
Qty: 28 TABLET | Refills: 4 | Status: SHIPPED | OUTPATIENT
Start: 2020-06-12 | End: 2020-10-23 | Stop reason: ALTCHOICE

## 2020-06-18 LAB
HPV HR 12 DNA CVX QL NAA+PROBE: NEGATIVE
HPV16 DNA CVX QL NAA+PROBE: NEGATIVE
HPV18 DNA CVX QL NAA+PROBE: NEGATIVE

## 2020-06-23 DIAGNOSIS — Z30.011 ENCOUNTER FOR INITIAL PRESCRIPTION OF CONTRACEPTIVE PILLS: ICD-10-CM

## 2020-06-23 LAB
LAB AP GYN PRIMARY INTERPRETATION: NORMAL
Lab: NORMAL

## 2020-06-23 RX ORDER — NORETHINDRONE ACETATE AND ETHINYL ESTRADIOL AND FERROUS FUMARATE 1MG-20(24)
KIT ORAL
Qty: 28 TABLET | Refills: 4 | OUTPATIENT
Start: 2020-06-23

## 2020-06-23 NOTE — TELEPHONE ENCOUNTER
Please check with patient/pharmacy to see if this prescription is okay  As far as I know, the prescriber is not

## 2020-06-26 ENCOUNTER — TELEPHONE (OUTPATIENT)
Dept: OBGYN CLINIC | Facility: CLINIC | Age: 30
End: 2020-06-26

## 2020-06-26 DIAGNOSIS — Z30.011 ENCOUNTER FOR INITIAL PRESCRIPTION OF CONTRACEPTIVE PILLS: Primary | ICD-10-CM

## 2020-06-26 RX ORDER — NORETHINDRONE ACETATE AND ETHINYL ESTRADIOL 1; .02 MG/1; MG/1
1 TABLET ORAL DAILY
Qty: 28 TABLET | Refills: 3 | Status: SHIPPED | OUTPATIENT
Start: 2020-06-26 | End: 2020-10-23 | Stop reason: ALTCHOICE

## 2020-07-10 ENCOUNTER — TELEPHONE (OUTPATIENT)
Dept: OBGYN CLINIC | Facility: CLINIC | Age: 30
End: 2020-07-10

## 2020-07-10 NOTE — TELEPHONE ENCOUNTER
Per Dr Anusha Fisher tried to call patient to check whether or not she got her BCP Rx  Letter from insurance states was not approved  Patient did not answer  Voicemail is full and could not leave a message

## 2020-08-26 ENCOUNTER — OFFICE VISIT (OUTPATIENT)
Dept: OBGYN CLINIC | Facility: CLINIC | Age: 30
End: 2020-08-26
Payer: COMMERCIAL

## 2020-08-26 VITALS
HEIGHT: 60 IN | BODY MASS INDEX: 36.52 KG/M2 | WEIGHT: 186 LBS | SYSTOLIC BLOOD PRESSURE: 130 MMHG | TEMPERATURE: 97.6 F | DIASTOLIC BLOOD PRESSURE: 70 MMHG

## 2020-08-26 DIAGNOSIS — Z86.59 HISTORY OF ANXIETY: ICD-10-CM

## 2020-08-26 DIAGNOSIS — N91.2 AMENORRHEA: ICD-10-CM

## 2020-08-26 DIAGNOSIS — Z33.1 INCIDENTAL PREGNANCY: Primary | ICD-10-CM

## 2020-08-26 DIAGNOSIS — Z72.0 TOBACCO ABUSE: ICD-10-CM

## 2020-08-26 DIAGNOSIS — F19.11 HISTORY OF DRUG ABUSE (HCC): ICD-10-CM

## 2020-08-26 DIAGNOSIS — Z98.891 HISTORY OF CESAREAN SECTION: ICD-10-CM

## 2020-08-26 LAB — SL AMB POCT URINE HCG: POSITIVE

## 2020-08-26 PROCEDURE — 81025 URINE PREGNANCY TEST: CPT | Performed by: OBSTETRICS & GYNECOLOGY

## 2020-08-26 PROCEDURE — 99214 OFFICE O/P EST MOD 30 MIN: CPT | Performed by: OBSTETRICS & GYNECOLOGY

## 2020-08-26 RX ORDER — PNV NO.95/FERROUS FUM/FOLIC AC 28MG-0.8MG
1 TABLET ORAL DAILY
Qty: 90 TABLET | Refills: 4 | Status: SHIPPED | OUTPATIENT
Start: 2020-08-26

## 2020-08-26 NOTE — PROGRESS NOTES
Assessment/Plan:    Diagnoses and all orders for this visit:    Incidental pregnancy  -     Obstetric panel; Future  -     hCG, quantitative; Future  -     Progesterone; Future  -     US OB < 14 weeks single or first gestation level 1; Future  -     Prenatal Vit-Fe Fumarate-FA (Prenatal Vitamin) 27-0 8 MG TABS; Take 1 capsule by mouth daily    Amenorrhea  -     POCT urine HCG  -     Progesterone; Future  -     TSH, 3rd generation with Free T4 reflex; Future    History of  section    History of drug abuse ([facility] [address] )    History of anxiety    Tobacco abuse        Subjective:  Pregnancy confirmation     Patient ID: Jhoan Lang is a 34 y o  female  HPI   59-year-old female  2 para 1 positive home pregnancy test FD LMP was 2020, EGA 6 weeks and 2 days [facility] 2021  Her 1st pregnancy ended  section due to arrest of dilatation and descent  She is considering elective repeat  section at this time  Presently the patient feels well denies nausea or vomiting symptoms  She does admit to smoking at least 1 pack of cigarettes per day  We did discuss cutting down, weaning down and hopefully stopping soon  She was advised if she has difficulty doing this we may substitute nicotine patch and or counseling as indicated  She does have a history of IV drug abuse meth and heroin  Was on Suboxone recently stopped has been clean for several months  Was also on Effexor stopped on her own  States she is doing well for now she does have a counselor who she sees weekly  She was told if need be she might get back on Suboxone if indicated  Also there are medications which are acceptable in pregnancy also if needed  Will schedule dating pelvic ultrasound within the next 2 weeks along with her nurse interview  Screening laboratory studies were ordered today recommend she get them done at her earliest convenience    Otherwise follow-up within the next 3-4 weeks for her prenatal physical exam   All questions answered  Review of Systems   Respiratory: Negative  Cardiovascular: Negative  Gastrointestinal: Negative  Genitourinary: Negative  Neurological: Negative  Psychiatric/Behavioral: Negative  All other systems reviewed and are negative  Objective:  No acute distress  /70 (BP Location: Left arm, Patient Position: Sitting, Cuff Size: Standard)   Temp 97 6 °F (36 4 °C)   Ht 5' (1 524 m)   Wt 84 4 kg (186 lb)   LMP 07/13/2020   BMI 36 33 kg/m²      Physical Exam  Vitals signs reviewed  HENT:      Head: Normocephalic  Eyes:      Pupils: Pupils are equal, round, and reactive to light  Pulmonary:      Effort: Pulmonary effort is normal    Genitourinary:     Comments: Pelvic exam deferred  Musculoskeletal: Normal range of motion  Neurological:      Mental Status: She is oriented to person, place, and time     Psychiatric:         Behavior: Behavior normal

## 2020-09-10 ENCOUNTER — CLINICAL SUPPORT (OUTPATIENT)
Dept: OBGYN CLINIC | Facility: CLINIC | Age: 30
End: 2020-09-10
Payer: COMMERCIAL

## 2020-09-10 ENCOUNTER — ULTRASOUND (OUTPATIENT)
Dept: OBGYN CLINIC | Facility: CLINIC | Age: 30
End: 2020-09-10
Payer: COMMERCIAL

## 2020-09-10 DIAGNOSIS — Z34.90 NORMAL PREGNANCY, ANTEPARTUM: Primary | ICD-10-CM

## 2020-09-10 DIAGNOSIS — Z36.9 ANTENATAL SCREENING ENCOUNTER: Primary | ICD-10-CM

## 2020-09-10 PROCEDURE — 87077 CULTURE AEROBIC IDENTIFY: CPT | Performed by: OBSTETRICS & GYNECOLOGY

## 2020-09-10 PROCEDURE — 76817 TRANSVAGINAL US OBSTETRIC: CPT | Performed by: OBSTETRICS & GYNECOLOGY

## 2020-09-10 PROCEDURE — T1001 NURSING ASSESSMENT/EVALUATN: HCPCS | Performed by: OBSTETRICS & GYNECOLOGY

## 2020-09-10 PROCEDURE — 87086 URINE CULTURE/COLONY COUNT: CPT | Performed by: OBSTETRICS & GYNECOLOGY

## 2020-09-10 PROCEDURE — 87186 SC STD MICRODIL/AGAR DIL: CPT | Performed by: OBSTETRICS & GYNECOLOGY

## 2020-09-10 NOTE — PROGRESS NOTES
OB INTAKE INTERVIEW / Anna Phlegm  * Pt presents for OB intake  * Accompanied by: Father of baby, Milka Lancaster  *  *Pt's LMP was 2020  *Ultrasound date:2020   9weeks 0days  *Estimated date of delivery: 4/15/2021   * confirmed by US  *Patient's Occupation: Not currently working  *Signs/Symptoms of Pregnancy   *No constipation    *yes Headaches-was taking ibuprofin but advised tylenol   *No cramping/spotting    *No PICA cravings   *Diabetes  If any of the following answers are  yes, please order 1 hour GTT, 50g   *No hx of GDM    *No BMI >35    *No first degree relative with type 2 diabetes    *No hx of PCOS   *No current metformin use    *No prior hx of LGA/macrosomia    *No AMA with other risk factors   *Hypertension- If any of the following answers are yes, please order preeclampsia labs including 24 hour urine protein   *No Hx of chronic HTN    *No Hx of gestational HTN   *No hx of preeclampsia, eclampsia, or HELLP syndrome   *Infection Screening   *Yes Does the pt have a hx of MRSA? *if yes- follow MRSA protocol and obtain a nasal swab for MRSA culture   *No history of herpes?   Cold sores  *Immunizations:   *Yes Discussed influenza vaccine      Due: 2020   *yes Discussed Tdap vaccine  *Previous Delivery Complications:   *No  delivery   *Yes Previous   *Interview education   *Handouts given:    *Baby and Me support center    *Antonio Willis sign up instructions    *Lab Locations    *[facility]    *[facility] Childbirth and Parenting Classes    *Pre-Birth Registration form completed    *Schedule for prenatal visits    *Schedule for ultrasounds    *Pregnancy Warning Signs reviewed    *Safe Medications During Pregnancy    *[facility]    *Advanced Pregnancy Guidelines    *Perineal Massage    *Breast Pump    *Cord blood and cord tissue collection information  *[facility]   *Yes discussed genetic testing- pt interested    *Yes appointment at [facility] made    Patient has been informed of basic prenatal advice such as avoiding alcohol, drugs, and smoking  She should remain hydrated and take daily prenatal vitamins  Patient should avoid caffeine, raw sprouts, high mercury fish, undercooked fish, raw eggs, organ meat, unwashed produce, and unpasteurized cheeses, milk, and fruit juice  She has been informed about toxoplasmosis and to avoid cat feces and undercooked meats  *I have these concerns about this prenatal patient: Patient is currently smoking cigarettes  She has a history of drug abuse  She recently has stopped her Subutex  *Details that I feel the provider should be aware of: Father of baby is positive for Hepatitis C and is not being treated  PN1 visit scheduled  The patient was oriented to our practice and all questions were answered      Interviewed by:  Hector Montez RDMS

## 2020-09-12 LAB
BACTERIA UR CULT: ABNORMAL
BACTERIA UR CULT: ABNORMAL

## 2020-09-14 DIAGNOSIS — B95.2 UTI (URINARY TRACT INFECTION) DUE TO ENTEROCOCCUS: Primary | ICD-10-CM

## 2020-09-14 DIAGNOSIS — N39.0 UTI (URINARY TRACT INFECTION) DUE TO ENTEROCOCCUS: Primary | ICD-10-CM

## 2020-09-14 RX ORDER — NITROFURANTOIN 25; 75 MG/1; MG/1
100 CAPSULE ORAL 2 TIMES DAILY
Qty: 10 CAPSULE | Refills: 0 | Status: SHIPPED | OUTPATIENT
Start: 2020-09-14 | End: 2020-09-19

## 2020-09-14 NOTE — PROGRESS NOTES
Asymptomatic UTI antibiotics sent to pharmacy  [facility] 1 p o  B i d  5 days

## 2020-09-18 ENCOUNTER — APPOINTMENT (OUTPATIENT)
Dept: LAB | Facility: HOSPITAL | Age: 30
End: 2020-09-18
Attending: OBSTETRICS & GYNECOLOGY
Payer: COMMERCIAL

## 2020-09-18 DIAGNOSIS — Z33.1 INCIDENTAL PREGNANCY: ICD-10-CM

## 2020-09-18 DIAGNOSIS — N91.2 AMENORRHEA: ICD-10-CM

## 2020-09-18 LAB
ABO GROUP BLD: NORMAL
B-HCG SERPL-ACNC: ABNORMAL MIU/ML
BASOPHILS # BLD AUTO: 0.02 THOUSANDS/ΜL (ref 0–0.1)
BASOPHILS NFR BLD AUTO: 0 % (ref 0–1)
BLD GP AB SCN SERPL QL: NEGATIVE
EOSINOPHIL # BLD AUTO: 0.04 THOUSAND/ΜL (ref 0–0.61)
EOSINOPHIL NFR BLD AUTO: 0 % (ref 0–6)
ERYTHROCYTE [DISTWIDTH] IN BLOOD BY AUTOMATED COUNT: 14.2 % (ref 11.6–15.1)
HBV SURFACE AG SER QL: NORMAL
HCT VFR BLD AUTO: 37.5 % (ref 34.8–46.1)
HGB BLD-MCNC: 12.5 G/DL (ref 11.5–15.4)
IMM GRANULOCYTES # BLD AUTO: 0.03 THOUSAND/UL (ref 0–0.2)
IMM GRANULOCYTES NFR BLD AUTO: 0 % (ref 0–2)
LYMPHOCYTES # BLD AUTO: 2.5 THOUSANDS/ΜL (ref 0.6–4.47)
LYMPHOCYTES NFR BLD AUTO: 28 % (ref 14–44)
MCH RBC QN AUTO: 29.6 PG (ref 26.8–34.3)
MCHC RBC AUTO-ENTMCNC: 33.3 G/DL (ref 31.4–37.4)
MCV RBC AUTO: 89 FL (ref 82–98)
MONOCYTES # BLD AUTO: 0.47 THOUSAND/ΜL (ref 0.17–1.22)
MONOCYTES NFR BLD AUTO: 5 % (ref 4–12)
NEUTROPHILS # BLD AUTO: 5.94 THOUSANDS/ΜL (ref 1.85–7.62)
NEUTS SEG NFR BLD AUTO: 67 % (ref 43–75)
NRBC BLD AUTO-RTO: 0 /100 WBCS
PLATELET # BLD AUTO: 270 THOUSANDS/UL (ref 149–390)
PMV BLD AUTO: 10.3 FL (ref 8.9–12.7)
PROGEST SERPL-MCNC: 18.5 NG/ML
RBC # BLD AUTO: 4.22 MILLION/UL (ref 3.81–5.12)
RH BLD: POSITIVE
RUBV IGG SERPL IA-ACNC: 124.4 IU/ML
SPECIMEN EXPIRATION DATE: NORMAL
TSH SERPL DL<=0.05 MIU/L-ACNC: 3.18 UIU/ML (ref 0.36–3.74)
WBC # BLD AUTO: 9 THOUSAND/UL (ref 4.31–10.16)

## 2020-09-18 PROCEDURE — 36415 COLL VENOUS BLD VENIPUNCTURE: CPT

## 2020-09-18 PROCEDURE — 84702 CHORIONIC GONADOTROPIN TEST: CPT

## 2020-09-18 PROCEDURE — 84443 ASSAY THYROID STIM HORMONE: CPT

## 2020-09-18 PROCEDURE — 80081 OBSTETRIC PANEL INC HIV TSTG: CPT

## 2020-09-18 PROCEDURE — 84144 ASSAY OF PROGESTERONE: CPT

## 2020-09-21 LAB
HIV 1+2 AB+HIV1 P24 AG SERPL QL IA: NORMAL
RPR SER QL: NORMAL

## 2020-09-23 ENCOUNTER — INITIAL PRENATAL (OUTPATIENT)
Dept: OBGYN CLINIC | Facility: CLINIC | Age: 30
End: 2020-09-23
Payer: COMMERCIAL

## 2020-09-23 VITALS
DIASTOLIC BLOOD PRESSURE: 76 MMHG | WEIGHT: 184.2 LBS | BODY MASS INDEX: 35.97 KG/M2 | TEMPERATURE: 97.6 F | SYSTOLIC BLOOD PRESSURE: 144 MMHG

## 2020-09-23 DIAGNOSIS — Z3A.10 10 WEEKS GESTATION OF PREGNANCY: Primary | ICD-10-CM

## 2020-09-23 DIAGNOSIS — F19.11 HX OF DRUG ABUSE (HCC): ICD-10-CM

## 2020-09-23 DIAGNOSIS — Z98.891 HISTORY OF CESAREAN SECTION: ICD-10-CM

## 2020-09-23 LAB
SL AMB  POCT GLUCOSE, UA: NORMAL
SL AMB POCT URINE PROTEIN: NORMAL

## 2020-09-23 PROCEDURE — 87491 CHLMYD TRACH DNA AMP PROBE: CPT | Performed by: OBSTETRICS & GYNECOLOGY

## 2020-09-23 PROCEDURE — 81002 URINALYSIS NONAUTO W/O SCOPE: CPT | Performed by: OBSTETRICS & GYNECOLOGY

## 2020-09-23 PROCEDURE — 99214 OFFICE O/P EST MOD 30 MIN: CPT | Performed by: OBSTETRICS & GYNECOLOGY

## 2020-09-23 PROCEDURE — 87591 N.GONORRHOEAE DNA AMP PROB: CPT | Performed by: OBSTETRICS & GYNECOLOGY

## 2020-09-23 NOTE — PROGRESS NOTES
IUP at 10 weeks and 2 days  Patient here for initial prenatal physical exam   Blood pressure slightly elevated 144/76  Patient was previously on Suboxone has been clean she stopped on her own  Exam  Today was normal   Screening laboratory studies completed  She does have sequential screen testing scheduled on   Patient otherwise doing well follow-up here in 4 weeks and p r n  all questions answered  History of  section x1, patient desires elective repeat

## 2020-10-02 ENCOUNTER — TELEPHONE (OUTPATIENT)
Dept: PERINATAL CARE | Facility: CLINIC | Age: 30
End: 2020-10-02

## 2020-10-02 LAB
C TRACH DNA SPEC QL NAA+PROBE: NEGATIVE
N GONORRHOEA DNA SPEC QL NAA+PROBE: NEGATIVE

## 2020-10-05 ENCOUNTER — ROUTINE PRENATAL (OUTPATIENT)
Dept: PERINATAL CARE | Facility: OTHER | Age: 30
End: 2020-10-05
Payer: COMMERCIAL

## 2020-10-05 VITALS
HEART RATE: 92 BPM | HEIGHT: 60 IN | SYSTOLIC BLOOD PRESSURE: 130 MMHG | DIASTOLIC BLOOD PRESSURE: 79 MMHG | BODY MASS INDEX: 36.2 KG/M2 | TEMPERATURE: 98.5 F | WEIGHT: 184.4 LBS

## 2020-10-05 DIAGNOSIS — Z98.891 HISTORY OF CESAREAN SECTION: ICD-10-CM

## 2020-10-05 DIAGNOSIS — O99.211 MATERNAL OBESITY, ANTEPARTUM, FIRST TRIMESTER: Primary | ICD-10-CM

## 2020-10-05 DIAGNOSIS — Z3A.12 12 WEEKS GESTATION OF PREGNANCY: ICD-10-CM

## 2020-10-05 DIAGNOSIS — Z36.89 ENCOUNTER TO ESTABLISH GESTATIONAL AGE USING ULTRASOUND: ICD-10-CM

## 2020-10-05 DIAGNOSIS — O99.331 TOBACCO SMOKING AFFECTING PREGNANCY IN FIRST TRIMESTER: ICD-10-CM

## 2020-10-05 PROCEDURE — 99242 OFF/OP CONSLTJ NEW/EST SF 20: CPT | Performed by: OBSTETRICS & GYNECOLOGY

## 2020-10-05 PROCEDURE — 76801 OB US < 14 WKS SINGLE FETUS: CPT | Performed by: OBSTETRICS & GYNECOLOGY

## 2020-10-05 RX ORDER — BUPRENORPHINE HYDROCHLORIDE AND NALOXONE HYDROCHLORIDE DIHYDRATE 8; 2 MG/1; MG/1
TABLET SUBLINGUAL
COMMUNITY
Start: 2020-06-29 | End: 2020-10-23 | Stop reason: ALTCHOICE

## 2020-10-06 PROBLEM — O99.331 TOBACCO SMOKING AFFECTING PREGNANCY IN FIRST TRIMESTER: Status: ACTIVE | Noted: 2020-10-06

## 2020-10-06 PROBLEM — O99.211 MATERNAL OBESITY, ANTEPARTUM, FIRST TRIMESTER: Status: ACTIVE | Noted: 2020-10-06

## 2020-10-06 RX ORDER — ASPIRIN 81 MG/1
162 TABLET, CHEWABLE ORAL DAILY
Qty: 180 TABLET | Refills: 1 | Status: SHIPPED | OUTPATIENT
Start: 2020-10-06 | End: 2021-03-26 | Stop reason: ALTCHOICE

## 2020-10-23 ENCOUNTER — APPOINTMENT (OUTPATIENT)
Dept: LAB | Facility: HOSPITAL | Age: 30
End: 2020-10-23
Payer: COMMERCIAL

## 2020-10-23 ENCOUNTER — ROUTINE PRENATAL (OUTPATIENT)
Dept: OBGYN CLINIC | Facility: CLINIC | Age: 30
End: 2020-10-23
Payer: COMMERCIAL

## 2020-10-23 VITALS
TEMPERATURE: 97.4 F | BODY MASS INDEX: 37.03 KG/M2 | SYSTOLIC BLOOD PRESSURE: 126 MMHG | WEIGHT: 189.6 LBS | DIASTOLIC BLOOD PRESSURE: 80 MMHG

## 2020-10-23 DIAGNOSIS — Z3A.14 14 WEEKS GESTATION OF PREGNANCY: Primary | ICD-10-CM

## 2020-10-23 DIAGNOSIS — B37.3 VAGINITIS DUE TO CANDIDA ALBICANS: ICD-10-CM

## 2020-10-23 LAB
BV WHIFF TEST VAG QL: NEGATIVE
CLUE CELLS SPEC QL WET PREP: NEGATIVE
PH SMN: 4 [PH]
SL AMB POCT BILIRUBIN,UA: NORMAL
SL AMB POCT URINE PROTEIN: NORMAL
SL AMB POCT WET MOUNT: YES
T VAGINALIS VAG QL WET PREP: NEGATIVE
YEAST VAG QL WET PREP: POSITIVE

## 2020-10-23 PROCEDURE — 99214 OFFICE O/P EST MOD 30 MIN: CPT | Performed by: OBSTETRICS & GYNECOLOGY

## 2020-10-23 PROCEDURE — 87186 SC STD MICRODIL/AGAR DIL: CPT | Performed by: OBSTETRICS & GYNECOLOGY

## 2020-10-23 PROCEDURE — 87077 CULTURE AEROBIC IDENTIFY: CPT | Performed by: OBSTETRICS & GYNECOLOGY

## 2020-10-23 PROCEDURE — 90471 IMMUNIZATION ADMIN: CPT | Performed by: OBSTETRICS & GYNECOLOGY

## 2020-10-23 PROCEDURE — 81002 URINALYSIS NONAUTO W/O SCOPE: CPT | Performed by: OBSTETRICS & GYNECOLOGY

## 2020-10-23 PROCEDURE — 87086 URINE CULTURE/COLONY COUNT: CPT | Performed by: OBSTETRICS & GYNECOLOGY

## 2020-10-23 PROCEDURE — 87210 SMEAR WET MOUNT SALINE/INK: CPT | Performed by: OBSTETRICS & GYNECOLOGY

## 2020-10-23 PROCEDURE — 90686 IIV4 VACC NO PRSV 0.5 ML IM: CPT | Performed by: OBSTETRICS & GYNECOLOGY

## 2020-10-25 LAB
BACTERIA UR CULT: ABNORMAL
BACTERIA UR CULT: ABNORMAL

## 2020-11-20 ENCOUNTER — ROUTINE PRENATAL (OUTPATIENT)
Dept: OBGYN CLINIC | Facility: CLINIC | Age: 30
End: 2020-11-20
Payer: COMMERCIAL

## 2020-11-20 VITALS
SYSTOLIC BLOOD PRESSURE: 128 MMHG | HEIGHT: 60 IN | WEIGHT: 196.2 LBS | BODY MASS INDEX: 38.52 KG/M2 | DIASTOLIC BLOOD PRESSURE: 76 MMHG

## 2020-11-20 DIAGNOSIS — Z3A.18 18 WEEKS GESTATION OF PREGNANCY: Primary | ICD-10-CM

## 2020-11-20 DIAGNOSIS — N30.00 ACUTE CYSTITIS WITHOUT HEMATURIA: ICD-10-CM

## 2020-11-20 DIAGNOSIS — O23.40 URINARY TRACT INFECTION IN MOTHER DURING PREGNANCY, ANTEPARTUM: ICD-10-CM

## 2020-11-20 LAB
SL AMB  POCT GLUCOSE, UA: NORMAL
SL AMB POCT URINE PROTEIN: NORMAL

## 2020-11-20 PROCEDURE — 99214 OFFICE O/P EST MOD 30 MIN: CPT | Performed by: OBSTETRICS & GYNECOLOGY

## 2020-11-20 PROCEDURE — 81002 URINALYSIS NONAUTO W/O SCOPE: CPT | Performed by: OBSTETRICS & GYNECOLOGY

## 2020-11-20 RX ORDER — NITROFURANTOIN MACROCRYSTALS 50 MG/1
50 CAPSULE ORAL
Qty: 30 CAPSULE | Refills: 6 | Status: SHIPPED | OUTPATIENT
Start: 2020-11-20 | End: 2021-03-26 | Stop reason: ALTCHOICE

## 2020-11-20 RX ORDER — CEPHALEXIN 500 MG/1
500 CAPSULE ORAL EVERY 6 HOURS SCHEDULED
Qty: 28 CAPSULE | Refills: 0 | Status: SHIPPED | OUTPATIENT
Start: 2020-11-20 | End: 2020-11-27

## 2020-11-25 ENCOUNTER — TELEPHONE (OUTPATIENT)
Dept: PERINATAL CARE | Facility: OTHER | Age: 30
End: 2020-11-25

## 2020-12-14 ENCOUNTER — TELEPHONE (OUTPATIENT)
Dept: OBGYN CLINIC | Facility: CLINIC | Age: 30
End: 2020-12-14

## 2020-12-18 ENCOUNTER — ROUTINE PRENATAL (OUTPATIENT)
Dept: OBGYN CLINIC | Facility: CLINIC | Age: 30
End: 2020-12-18
Payer: COMMERCIAL

## 2020-12-18 VITALS
WEIGHT: 200 LBS | HEIGHT: 60 IN | DIASTOLIC BLOOD PRESSURE: 82 MMHG | SYSTOLIC BLOOD PRESSURE: 136 MMHG | BODY MASS INDEX: 39.27 KG/M2

## 2020-12-18 DIAGNOSIS — Z3A.22 22 WEEKS GESTATION OF PREGNANCY: Primary | ICD-10-CM

## 2020-12-18 DIAGNOSIS — O23.40 URINARY TRACT INFECTION IN MOTHER DURING PREGNANCY, ANTEPARTUM: ICD-10-CM

## 2020-12-18 DIAGNOSIS — E66.9 OBESITY (BMI 30-39.9): ICD-10-CM

## 2020-12-18 DIAGNOSIS — K21.9 GASTROESOPHAGEAL REFLUX DISEASE, UNSPECIFIED WHETHER ESOPHAGITIS PRESENT: ICD-10-CM

## 2020-12-18 DIAGNOSIS — Z72.0 TOBACCO ABUSE: ICD-10-CM

## 2020-12-18 LAB
SL AMB  POCT GLUCOSE, UA: NORMAL
SL AMB POCT URINE PROTEIN: NORMAL

## 2020-12-18 PROCEDURE — 99213 OFFICE O/P EST LOW 20 MIN: CPT | Performed by: OBSTETRICS & GYNECOLOGY

## 2020-12-18 PROCEDURE — 81002 URINALYSIS NONAUTO W/O SCOPE: CPT | Performed by: OBSTETRICS & GYNECOLOGY

## 2020-12-18 RX ORDER — OMEPRAZOLE 20 MG/1
20 TABLET, DELAYED RELEASE ORAL DAILY
Qty: 30 TABLET | Refills: 6 | Status: SHIPPED | OUTPATIENT
Start: 2020-12-18

## 2020-12-23 ENCOUNTER — TELEPHONE (OUTPATIENT)
Dept: PERINATAL CARE | Facility: CLINIC | Age: 30
End: 2020-12-23

## 2020-12-24 ENCOUNTER — ROUTINE PRENATAL (OUTPATIENT)
Dept: PERINATAL CARE | Facility: OTHER | Age: 30
End: 2020-12-24
Payer: COMMERCIAL

## 2020-12-24 VITALS
BODY MASS INDEX: 39.7 KG/M2 | HEART RATE: 94 BPM | SYSTOLIC BLOOD PRESSURE: 135 MMHG | WEIGHT: 202.2 LBS | HEIGHT: 60 IN | DIASTOLIC BLOOD PRESSURE: 85 MMHG

## 2020-12-24 DIAGNOSIS — Z3A.23 23 WEEKS GESTATION OF PREGNANCY: ICD-10-CM

## 2020-12-24 DIAGNOSIS — Z36.86 ENCOUNTER FOR ANTENATAL SCREENING FOR CERVICAL LENGTH: ICD-10-CM

## 2020-12-24 DIAGNOSIS — O99.212 MATERNAL OBESITY, ANTEPARTUM, SECOND TRIMESTER: Primary | ICD-10-CM

## 2020-12-24 PROCEDURE — 99212 OFFICE O/P EST SF 10 MIN: CPT | Performed by: OBSTETRICS & GYNECOLOGY

## 2020-12-24 PROCEDURE — 76811 OB US DETAILED SNGL FETUS: CPT | Performed by: OBSTETRICS & GYNECOLOGY

## 2020-12-24 PROCEDURE — 76817 TRANSVAGINAL US OBSTETRIC: CPT | Performed by: OBSTETRICS & GYNECOLOGY

## 2021-01-22 ENCOUNTER — ROUTINE PRENATAL (OUTPATIENT)
Dept: OBGYN CLINIC | Facility: CLINIC | Age: 31
End: 2021-01-22
Payer: COMMERCIAL

## 2021-01-22 VITALS — DIASTOLIC BLOOD PRESSURE: 68 MMHG | WEIGHT: 207.8 LBS | BODY MASS INDEX: 40.58 KG/M2 | SYSTOLIC BLOOD PRESSURE: 132 MMHG

## 2021-01-22 DIAGNOSIS — K21.9 GASTROESOPHAGEAL REFLUX DISEASE, UNSPECIFIED WHETHER ESOPHAGITIS PRESENT: ICD-10-CM

## 2021-01-22 DIAGNOSIS — Z3A.27 27 WEEKS GESTATION OF PREGNANCY: Primary | ICD-10-CM

## 2021-01-22 DIAGNOSIS — Z72.0 TOBACCO ABUSE: ICD-10-CM

## 2021-01-22 LAB
SL AMB  POCT GLUCOSE, UA: ABNORMAL
SL AMB POCT URINE PROTEIN: ABNORMAL

## 2021-01-22 PROCEDURE — 81002 URINALYSIS NONAUTO W/O SCOPE: CPT | Performed by: OBSTETRICS & GYNECOLOGY

## 2021-01-22 PROCEDURE — 90715 TDAP VACCINE 7 YRS/> IM: CPT | Performed by: OBSTETRICS & GYNECOLOGY

## 2021-01-22 PROCEDURE — 90471 IMMUNIZATION ADMIN: CPT | Performed by: OBSTETRICS & GYNECOLOGY

## 2021-01-22 PROCEDURE — 99213 OFFICE O/P EST LOW 20 MIN: CPT | Performed by: OBSTETRICS & GYNECOLOGY

## 2021-01-22 RX ORDER — OMEPRAZOLE 20 MG/1
20 CAPSULE, DELAYED RELEASE ORAL DAILY
Qty: 30 CAPSULE | Refills: 4 | Status: SHIPPED | OUTPATIENT
Start: 2021-01-22 | End: 2021-03-26 | Stop reason: ALTCHOICE

## 2021-01-22 NOTE — PATIENT INSTRUCTIONS
Pregnancy at 32 to 30 [address]:   What changes are happening in my body? You may notice new symptoms such as shortness of breath, heartburn, or swelling of your ankles and feet  You may also have trouble sleeping or contractions  How do I care for myself at this stage of my pregnancy? · Eat a variety of healthy foods  Healthy foods include fruits, vegetables, whole-grain breads, low-fat dairy foods, beans, lean meats, and fish  Drink liquids as directed  Ask how much liquid to drink each day and which liquids are best for you  Limit caffeine to less than 200 milligrams each day  Limit your intake of fish to 2 servings each week  Choose fish low in mercury such as canned light tuna, shrimp, salmon, cod, or tilapia  Do not  eat fish high in mercury such as swordfish, tilefish, marlen mackerel, and shark  · Manage heartburn  by eating 4 or 5 small meals each day instead of large meals  Avoid spicy food  · Manage swelling  by lying down and putting your feet up  · Take prenatal vitamins as directed  Your need for certain vitamins and minerals, such as folic acid, increases during pregnancy  Prenatal vitamins provide some of the extra vitamins and minerals you need  Prenatal vitamins may also help to decrease the risk of certain birth defects  · Talk to your healthcare provider about exercise  Moderate exercise can help you stay fit  Your healthcare provider will help you plan an exercise program that is safe for you during pregnancy  · Do not smoke  Smoking increases your risk of a miscarriage and other health problems during your pregnancy  Smoking can cause your baby to be born too early or weigh less at birth  Ask your healthcare provider for information if you need help quitting  · Do not drink alcohol  Alcohol passes from your body to your baby through the placenta  It can affect your baby's brain development and cause fetal alcohol syndrome (FAS)   FAS is a group of conditions that causes mental, behavior, and growth problems  · Talk to your healthcare provider before you take any medicines  Many medicines may harm your baby if you take them when you are pregnant  Do not take any medicines, vitamins, herbs, or supplements without first talking to your healthcare provider  Never use illegal or street drugs (such as marijuana or cocaine) while you are pregnant  What are some safety tips during pregnancy? · Avoid hot tubs and saunas  Do not use a hot tub or sauna while you are pregnant, especially during your first trimester  Hot tubs and saunas may raise your baby's temperature and increase the risk of birth defects  · Avoid toxoplasmosis  This is an infection caused by eating raw meat or being around infected cat feces  It can cause birth defects, miscarriages, and other problems  Wash your hands after you touch raw meat  Make sure any meat is well-cooked before you eat it  Avoid raw eggs and unpasteurized milk  Use gloves or ask someone else to clean your cat's litter box while you are pregnant  What changes are happening with my baby? By 30 weeks, your baby may weigh more than 3 pounds  Your baby may be about 11 inches long from the top of the head to the rump (baby's bottom)  Your baby's eyes open and close now  Your baby's kicks and movements are more forceful at this time  What do I need to know about prenatal care? Your healthcare provider will check your blood pressure and weight  You may also need the following:  · Blood tests  may be done to check for anemia or blood type  · A urine test  may also be done to check for sugar and protein  These can be signs of gestational diabetes or infection  Protein in your urine may also be a sign of preeclampsia  Preeclampsia is a condition that can develop during week 20 or later of your pregnancy  It causes high blood pressure, and it can cause problems with your kidneys and other organs  · A Tdap vaccine and flu vaccine  may be recommended by your healthcare provider  · A gestational diabetes screen  will be done using an oral glucose tolerance test (OGTT)  An OGTT starts with a blood sugar level check after you have not eaten for 8 hours  You are then given a glucose drink  Your blood sugar level is checked after 1 hour, 2 hours, and sometimes 3 hours  Healthcare providers look at how much your blood sugar level increases from the first check  · Fundal height  is a measurement of your uterus to check your baby's growth  This number is usually the same as the number of weeks that you have been pregnant  Your healthcare provider may also check your baby's position  · Your baby's heart rate  will be checked  When should I seek immediate care? · You develop a severe headache that does not go away  · You have new or increased vision changes, such as blurred or spotted vision  · You have new or increased swelling in your face or hands  · You have vaginal spotting or bleeding  · Your water broke or you feel warm water gushing or trickling from your vagina  When should I contact my healthcare provider? · You have more than 5 contractions in 1 hour  · You notice any changes in your baby's movements  · You have abdominal cramps, pressure, or tightening  · You have a change in vaginal discharge  · You have chills or a fever  · You have vaginal itching, burning, or pain  · You have yellow, green, white, or foul-smelling vaginal discharge  · You have pain or burning when you urinate, less urine than usual, or pink or bloody urine  · You have questions or concerns about your condition or care  CARE AGREEMENT:   You have the right to help plan your care  Learn about your health condition and how it may be treated  Discuss treatment options with your healthcare providers to decide what care you want to receive   You always have the right to refuse treatment  The above information is an  only  It is not intended as medical advice for individual conditions or treatments  Talk to your doctor, nurse or pharmacist before following any medical regimen to see if it is safe and effective for you  © Copyright [address] Information is for End User's use only and may not be sold, redistributed or otherwise used for commercial purposes   All illustrations and images included in CareNotes® are the copyrighted property of A D A M , Inc  or [address] [place] [address]

## 2021-01-22 NOTE — PROGRESS NOTES
Patient seen today for prenatal visit  1  27 4 weeks-good fetal movement noted  Fetal movement and  labor precautions were reviewed  Tdap was given today  Patient did not have Glucola and labs done  She will get this done at home with mobile lab draw  Glucola drink was given today  Follow-up 3 weeks time prenatal visit  2  History UTI-treated x2 during the pregnancy  Never did have follow-up urine culture after 2nd antibiotic treatment  To get this done with the mobile lab draw at home  She continues on Macrodantin suppression  3  Previous history yeast/postcoital bleeding-no current issues  4   Previous history /macrosomia-arrest of descent noted for 9 lb 1 oz baby  Counseled about  versus trial of labor  Leaning towards repeat   We will continue to dialogue  5  Smoker-smokes 1/2 to 3/4 pack per day  Encouraged to decrease as much as possible  6  Previous history of drug use-on methadone in the past, clean for greater than 1 year's time  No medications at this time  7  History of anxiety-off of Effexor    8  Elevated BMI-consider weekly NST/EDWARD after 36 weeks  9  Heartburn-doing better with Prilosec  She will continue with this  Prescription for capsules was sent today, as tablets might not be covered by her insurance by her report

## 2021-01-29 ENCOUNTER — HOSPITAL ENCOUNTER (EMERGENCY)
Facility: HOSPITAL | Age: 31
Discharge: HOME | End: 2021-01-29
Attending: EMERGENCY MEDICINE | Admitting: EMERGENCY MEDICINE
Payer: COMMERCIAL

## 2021-01-29 VITALS
DIASTOLIC BLOOD PRESSURE: 76 MMHG | RESPIRATION RATE: 18 BRPM | TEMPERATURE: 97.4 F | BODY MASS INDEX: 40.6 KG/M2 | HEART RATE: 96 BPM | OXYGEN SATURATION: 99 % | WEIGHT: 207.89 LBS | SYSTOLIC BLOOD PRESSURE: 127 MMHG

## 2021-01-29 DIAGNOSIS — E87.6 HYPOKALEMIA: ICD-10-CM

## 2021-01-29 DIAGNOSIS — Z34.93 THIRD TRIMESTER PREGNANCY: Primary | ICD-10-CM

## 2021-01-29 LAB
ALBUMIN SERPL BCP-MCNC: 2.5 G/DL (ref 3.5–5)
ALP SERPL-CCNC: 108 U/L (ref 46–116)
ALT SERPL W P-5'-P-CCNC: 30 U/L (ref 12–78)
ANION GAP SERPL CALCULATED.3IONS-SCNC: 11 MMOL/L (ref 4–13)
AST SERPL W P-5'-P-CCNC: 26 U/L (ref 5–45)
BASOPHILS # BLD AUTO: 0.04 THOUSANDS/ΜL (ref 0–0.1)
BASOPHILS NFR BLD AUTO: 0 % (ref 0–1)
BILIRUB SERPL-MCNC: 0.27 MG/DL (ref 0.2–1)
BUN SERPL-MCNC: 8 MG/DL (ref 5–25)
CALCIUM ALBUM COR SERPL-MCNC: 9 MG/DL (ref 8.3–10.1)
CALCIUM SERPL-MCNC: 7.8 MG/DL (ref 8.3–10.1)
CHLORIDE SERPL-SCNC: 103 MMOL/L (ref 100–108)
CO2 SERPL-SCNC: 25 MMOL/L (ref 21–32)
CREAT SERPL-MCNC: 0.61 MG/DL (ref 0.6–1.3)
EOSINOPHIL # BLD AUTO: 0.03 THOUSAND/ΜL (ref 0–0.61)
EOSINOPHIL NFR BLD AUTO: 0 % (ref 0–6)
ERYTHROCYTE [DISTWIDTH] IN BLOOD BY AUTOMATED COUNT: 13.5 % (ref 11.6–15.1)
GFR SERPL CREATININE-BSD FRML MDRD: 122 ML/MIN/1.73SQ M
GLUCOSE SERPL-MCNC: 83 MG/DL (ref 65–140)
HCT VFR BLD AUTO: 31.6 % (ref 34.8–46.1)
HGB BLD-MCNC: 10.5 G/DL (ref 11.5–15.4)
IMM GRANULOCYTES # BLD AUTO: 0.07 THOUSAND/UL (ref 0–0.2)
IMM GRANULOCYTES NFR BLD AUTO: 1 % (ref 0–2)
LACTATE SERPL-SCNC: 0.6 MMOL/L (ref 0.5–2)
LIPASE SERPL-CCNC: 77 U/L (ref 73–393)
LYMPHOCYTES # BLD AUTO: 1.96 THOUSANDS/ΜL (ref 0.6–4.47)
LYMPHOCYTES NFR BLD AUTO: 18 % (ref 14–44)
MCH RBC QN AUTO: 28.8 PG (ref 26.8–34.3)
MCHC RBC AUTO-ENTMCNC: 33.2 G/DL (ref 31.4–37.4)
MCV RBC AUTO: 87 FL (ref 82–98)
MONOCYTES # BLD AUTO: 0.69 THOUSAND/ΜL (ref 0.17–1.22)
MONOCYTES NFR BLD AUTO: 6 % (ref 4–12)
NEUTROPHILS # BLD AUTO: 8.12 THOUSANDS/ΜL (ref 1.85–7.62)
NEUTS SEG NFR BLD AUTO: 75 % (ref 43–75)
NRBC BLD AUTO-RTO: 0 /100 WBCS
PLATELET # BLD AUTO: 270 THOUSANDS/UL (ref 149–390)
PMV BLD AUTO: 9.9 FL (ref 8.9–12.7)
POTASSIUM SERPL-SCNC: 3.4 MMOL/L (ref 3.5–5.3)
PROT SERPL-MCNC: 6.3 G/DL (ref 6.4–8.2)
RBC # BLD AUTO: 3.65 MILLION/UL (ref 3.81–5.12)
SODIUM SERPL-SCNC: 139 MMOL/L (ref 136–145)
WBC # BLD AUTO: 10.91 THOUSAND/UL (ref 4.31–10.16)

## 2021-01-29 PROCEDURE — 36415 COLL VENOUS BLD VENIPUNCTURE: CPT | Performed by: EMERGENCY MEDICINE

## 2021-01-29 PROCEDURE — 99284 EMERGENCY DEPT VISIT MOD MDM: CPT | Performed by: EMERGENCY MEDICINE

## 2021-01-29 PROCEDURE — 83690 ASSAY OF LIPASE: CPT | Performed by: EMERGENCY MEDICINE

## 2021-01-29 PROCEDURE — 83605 ASSAY OF LACTIC ACID: CPT | Performed by: EMERGENCY MEDICINE

## 2021-01-29 PROCEDURE — 85025 COMPLETE CBC W/AUTO DIFF WBC: CPT | Performed by: EMERGENCY MEDICINE

## 2021-01-29 PROCEDURE — 99284 EMERGENCY DEPT VISIT MOD MDM: CPT

## 2021-01-29 PROCEDURE — 80053 COMPREHEN METABOLIC PANEL: CPT | Performed by: EMERGENCY MEDICINE

## 2021-01-29 RX ORDER — ACETAMINOPHEN 325 MG/1
650 TABLET ORAL ONCE
Status: DISCONTINUED | OUTPATIENT
Start: 2021-01-29 | End: 2021-01-29 | Stop reason: HOSPADM

## 2021-01-29 NOTE — Clinical Note
Johannaclaudia Jessica was seen and treated in our emergency department on 1/29/2021  Diagnosis:     Amy Ireland  may return to work on return date  She may return on this date: 01/31/2021         If you have any questions or concerns, please don't hesitate to call        Tatianna Youssef MD    ______________________________           _______________          _______________  Hospital Representative                              Date                                Time

## 2021-01-30 NOTE — ED NOTES
Patient stated she felt movement from baby while accessing heart tones        Edith Beal RN  01/29/21 2027

## 2021-01-30 NOTE — ED PROVIDER NOTES
History  Chief Complaint   Patient presents with    Decreased Fetal Movement     Patient is 28 weeks pregnant and has not felt the baby move since late last night  Patient is having some abdominal cramping, leg swelling and pain in legs  Patient denies any bleeding or discharge  [de-identified] year old female with past medical history of GERD, marijuana use and daily tobacco use who is  and 28 weeks pregnant presents with concern for lack of fetal movement, abdominal cramping for one day  Patient states she has not felt the baby move since yesterday evening  Her last prenatal visit was on 2021 with DHARMESH Bowers at [facility]  Patient is on prenatal vitamin  She has had no complications during her pregnancy  Patient has taken Tylenol for abdominal cramping  Patient also states that her legs are sometimes sore but denies pain at this time and denies history of deep vein thrombosis  Patient denies fever, chills, headache, neck stiffness, sore throat, cough, sputum production, nausea, vomiting, chest pain, shortness of breath, back pain, rash, urinary symptoms, vaginal bleeding or discharge, focal motor or sensory deficits, lower extremity swelling or pain, diarrhea, bloody stools or constipation  No known COVID-19 exposures  Boyfriend at bedside  Dr Marion Aguilar wore PPE during clinical evaluation due to COVID-19 pandemic including bonnet, eye goggles, face mask, gown and gloves            History provided by:  Patient, medical records and friend   used: No    Abdominal Cramping  Pain location:  Generalized  Pain quality: cramping    Pain radiates to:  Does not radiate  Pain severity:  Moderate  Onset quality:  Sudden  Duration:  1 day  Timing:  Intermittent  Progression:  Waxing and waning  Chronicity:  New  Context: not alcohol use, not awakening from sleep, not diet changes, not eating, not laxative use, not medication withdrawal, not previous surgeries, not recent illness, not recent sexual activity, not recent travel, not retching, not sick contacts, not suspicious food intake and not trauma    Relieved by:  Nothing  Worsened by:  Nothing  Ineffective treatments:  None tried  Associated symptoms: no anorexia, no belching, no chest pain, no chills, no constipation, no cough, no diarrhea, no dysuria, no fatigue, no fever, no flatus, no hematemesis, no hematochezia, no hematuria, no melena, no nausea, no shortness of breath, no sore throat, no vaginal bleeding, no vaginal discharge and no vomiting    Risk factors: obesity and pregnancy    Risk factors: no alcohol abuse, no aspirin use, not elderly, has not had multiple surgeries, no NSAID use and no recent hospitalization        Prior to Admission Medications   Prescriptions Last Dose Informant Patient Reported? Taking?    Prenatal Vit-Fe Fumarate-FA (Prenatal Vitamin) 27-0 8 MG TABS  Self No No   Sig: Take 1 capsule by mouth daily   aspirin 81 mg chewable tablet  Self No No   Sig: Chew 2 tablets (162 mg total) daily   nitrofurantoin (MACRODANTIN) 50 mg capsule  Self No No   Sig: Take 1 capsule (50 mg total) by mouth daily at bedtime   omeprazole (PriLOSEC OTC) 20 MG tablet  Self No No   Sig: Take 1 tablet (20 mg total) by mouth daily   omeprazole (PriLOSEC) 20 mg delayed release capsule   No No   Sig: Take 1 capsule (20 mg total) by mouth daily   terconazole (TERAZOL 7) 0 4 % vaginal cream  Self No No   Sig: Insert 1 applicator into the vagina daily at bedtime      Facility-Administered Medications: None       Past Medical History:   Diagnosis Date    Abnormal Pap smear of cervix     Anemia     CTS (carpal tunnel syndrome)     Depression     Fetal macrosomia     Human papilloma virus (HPV) infection     Last Assessed: 12/18/2013    Migraine     Mild cervical dysplasia     Opioid abuse, [facility] ([facility] [address] [facility]     Pap Smear (+) Low Grade Squamous Intraepithelial Lesion 11/04/2013    with colposcopy Dec  2013 with negative biopsies except for parakeratosis    Postcoital bleeding     Last Assessed: 2013    Substance abuse ([facility] [address] )     previous    Urinary tract infection     Varicella     both vaccine and chicken pox       Past Surgical History:   Procedure Laterality Date     SECTION  2010    primary low transverse  for arrest of dilation at term on 7/9/10  Patient had live male Apgars  with weight 9 lbs  1 oz   COLPOSCOPY W/ BIOPSY / CURETTAGE  2013    12/3/13 patient with colposcopy wtih ECCs ad biopsies at 12  All biopsies were negative with parakeratosis at 12       Family History   Problem Relation Age of Onset    Cancer Family     Diabetes Family     Hypertension Family     Thyroid disease Family         Disorder    Other Family         Back pain    Completed Suicide  Mother     No Known Problems Father      I have reviewed and agree with the history as documented  E-Cigarette/Vaping    E-Cigarette Use Never User      E-Cigarette/Vaping Substances    Nicotine No     THC No     CBD No     Flavoring No     Other No     Unknown No      Social History     Tobacco Use    Smoking status: Current Every Day Smoker     Packs/day: 1 00     Types: Cigarettes    Smokeless tobacco: Never Used   Substance Use Topics    Alcohol use: Not Currently    Drug use: Not Currently     Types: Heroin, Methamphetamines       Review of Systems   Constitutional: Negative for chills, diaphoresis, fatigue and fever  HENT: Negative for congestion, drooling, facial swelling, nosebleeds, sneezing, sore throat, trouble swallowing and voice change  Eyes: Negative for photophobia, pain and visual disturbance  Respiratory: Negative for cough, chest tightness, shortness of breath and wheezing  Cardiovascular: Negative for chest pain, palpitations and leg swelling  Gastrointestinal: Positive for abdominal pain   Negative for anorexia, blood in stool, constipation, diarrhea, flatus, hematemesis, hematochezia, melena, nausea and vomiting  Genitourinary: Negative for decreased urine volume, difficulty urinating, dysuria, flank pain, frequency, hematuria, urgency, vaginal bleeding and vaginal discharge  Musculoskeletal: Positive for myalgias  Negative for arthralgias, back pain, gait problem, joint swelling, neck pain and neck stiffness  Skin: Negative for color change, pallor, rash and wound  Allergic/Immunologic: Negative for immunocompromised state  Neurological: Negative for dizziness, tremors, seizures, syncope, facial asymmetry, speech difficulty, weakness, light-headedness, numbness and headaches  Hematological: Negative for adenopathy  Psychiatric/Behavioral: Negative for agitation, confusion, hallucinations and suicidal ideas  The patient is not nervous/anxious  Physical Exam  Physical Exam  Vitals signs and nursing note reviewed  Exam conducted with a chaperone present  Constitutional:       General: She is not in acute distress  Appearance: Normal appearance  She is well-developed and normal weight  She is not ill-appearing, toxic-appearing or diaphoretic  HENT:      Head: Normocephalic and atraumatic  Jaw: There is normal jaw occlusion  Right Ear: Hearing, tympanic membrane, ear canal and external ear normal  There is no impacted cerumen  No mastoid tenderness  No hemotympanum  Left Ear: Hearing, tympanic membrane, ear canal and external ear normal  There is no impacted cerumen  No mastoid tenderness  No hemotympanum  Nose: Nose normal       Right Nostril: No epistaxis  Left Nostril: No epistaxis  Right Sinus: No maxillary sinus tenderness or frontal sinus tenderness  Left Sinus: No maxillary sinus tenderness or frontal sinus tenderness  Mouth/Throat:      Lips: Pink  No lesions  Mouth: Mucous membranes are moist  No lacerations or angioedema  Tongue: No lesions  Tongue does not deviate from midline  Palate: No mass and lesions  Pharynx: Oropharynx is clear  Uvula midline  No pharyngeal swelling, oropharyngeal exudate, posterior oropharyngeal erythema or uvula swelling  Tonsils: No tonsillar exudate or tonsillar abscesses  Eyes:      General: Lids are normal  Vision grossly intact  Gaze aligned appropriately  No visual field deficit or scleral icterus  Right eye: No discharge  Left eye: No discharge  Extraocular Movements: Extraocular movements intact  Right eye: No nystagmus  Left eye: No nystagmus  Conjunctiva/sclera: Conjunctivae normal       Right eye: Right conjunctiva is not injected  Left eye: Left conjunctiva is not injected  Pupils: Pupils are equal, round, and reactive to light  Neck:      Musculoskeletal: Full passive range of motion without pain, normal range of motion and neck supple  No neck rigidity, spinous process tenderness or muscular tenderness  Thyroid: No thyroid mass or thyromegaly  Trachea: Trachea and phonation normal    Cardiovascular:      Rate and Rhythm: Normal rate and regular rhythm  Pulses: Normal pulses  Radial pulses are 2+ on the right side and 2+ on the left side  Dorsalis pedis pulses are 2+ on the right side and 2+ on the left side  Heart sounds: Normal heart sounds, S1 normal and S2 normal    Pulmonary:      Effort: Pulmonary effort is normal  No tachypnea, accessory muscle usage, respiratory distress or retractions  Breath sounds: Normal breath sounds and air entry  No stridor or decreased air movement  No decreased breath sounds, wheezing, rhonchi or rales  Chest:      Chest wall: No tenderness  Abdominal:      General: Abdomen is flat  Bowel sounds are normal  There is no distension  Palpations: Abdomen is soft  Abdomen is not rigid  There is no mass  Tenderness: There is abdominal tenderness   There is no right CVA tenderness, left CVA tenderness, guarding or rebound  Negative signs include Nguyen's sign and McBurney's sign  Hernia: No hernia is present  Comments: Gravid abdomen, mildly tender at fundus   Musculoskeletal: Normal range of motion  General: No swelling, tenderness, deformity or signs of injury  Right lower leg: No edema  Left lower leg: No edema  Lymphadenopathy:      Cervical: No cervical adenopathy  Skin:     General: Skin is warm and dry  Capillary Refill: Capillary refill takes less than 2 seconds  Coloration: Skin is not ashen, cyanotic, jaundiced, mottled, pale or sallow  Findings: No abrasion, abscess, acne, bruising, burn, ecchymosis, erythema, signs of injury, laceration, lesion, petechiae, rash or wound  Rash is not macular or papular  Neurological:      General: No focal deficit present  Mental Status: She is alert and oriented to person, place, and time  Mental status is at baseline  She is not disoriented  GCS: GCS eye subscore is 4  GCS verbal subscore is 5  GCS motor subscore is 6  Cranial Nerves: Cranial nerves are intact  No cranial nerve deficit, dysarthria or facial asymmetry  Sensory: Sensation is intact  No sensory deficit  Motor: Motor function is intact  No weakness, tremor, atrophy, abnormal muscle tone or seizure activity  Coordination: Coordination is intact  Coordination normal       Gait: Gait is intact  Gait normal       Comments: Patient is AAOx4, GCS 15; speaking clearly and appropriately; motor and sensation intact; visual fields intact; cranial nerves II-XII grossly intact; no facial droop, slurred speech or arm drift   Psychiatric:         Attention and Perception: Attention and perception normal  She is attentive  Mood and Affect: Mood and affect normal          Speech: Speech normal          Behavior: Behavior normal  Behavior is cooperative  Thought Content:  Thought content normal          Cognition and Memory: Cognition and memory normal          Judgment: Judgment normal          Vital Signs  ED Triage Vitals [01/29/21 2016]   Temperature Pulse Respirations Blood Pressure SpO2   (!) 97 4 °F (36 3 °C) (!) 119 18 139/70 100 %      Temp Source Heart Rate Source Patient Position - Orthostatic VS BP Location FiO2 (%)   Temporal Monitor Lying Left arm --      Pain Score       3           Vitals:    01/29/21 2016 01/29/21 2100   BP: 139/70 127/76   Pulse: (!) 119 96   Patient Position - Orthostatic VS: Lying Lying         Visual Acuity      ED Medications  Medications   acetaminophen (TYLENOL) tablet 650 mg (650 mg Oral Not Given 1/29/21 2059)       Diagnostic Studies  Results Reviewed     Procedure Component Value Units Date/Time    Lactic acid, plasma [[id number]]  (Normal) Collected: 01/29/21 2034    Lab Status: Final result Specimen: Blood from Arm, Right Updated: 01/29/21 2107     LACTIC ACID 0 6 mmol/L     Narrative:      Result may be elevated if tourniquet was used during collection      Comprehensive metabolic panel [[id number]]  (Abnormal) Collected: 01/29/21 2034    Lab Status: Final result Specimen: Blood from Arm, Right Updated: 01/29/21 2102     Sodium 139 mmol/L      Potassium 3 4 mmol/L      Chloride 103 mmol/L      CO2 25 mmol/L      ANION GAP 11 mmol/L      BUN 8 mg/dL      Creatinine 0 61 mg/dL      Glucose 83 mg/dL      Calcium 7 8 mg/dL      Corrected Calcium 9 0 mg/dL      AST 26 U/L      ALT 30 U/L      Alkaline Phosphatase 108 U/L      Total Protein 6 3 g/dL      Albumin 2 5 g/dL      Total Bilirubin 0 27 mg/dL      eGFR 122 ml/min/1 73sq m     Narrative:      [place] guidelines for Chronic Kidney Disease (CKD):     Stage 1 with normal or high GFR (GFR > 90 mL/min/1 73 square meters)    Stage 2 Mild CKD (GFR = 60-89 mL/min/1 73 square meters)    Stage 3A Moderate CKD (GFR = 45-59 mL/min/1 73 square meters)    Stage 3B Moderate CKD (GFR = 30-44 mL/min/1 73 square meters)   Stage 4 Severe CKD (GFR = 15-29 mL/min/1 73 square meters)    Stage 5 End Stage CKD (GFR <15 mL/min/1 73 square meters)  Note: GFR calculation is accurate only with a steady state creatinine    Lipase [[id number]]  (Normal) Collected: 01/29/21 2034    Lab Status: Final result Specimen: Blood from Arm, Right Updated: 01/29/21 2102     Lipase 77 u/L     CBC and differential [[id number]]  (Abnormal) Collected: 01/29/21 2034    Lab Status: Final result Specimen: Blood from Arm, Right Updated: 01/29/21 2040     WBC 10 91 Thousand/uL      RBC 3 65 Million/uL      Hemoglobin 10 5 g/dL      Hematocrit 31 6 %      MCV 87 fL      MCH 28 8 pg      MCHC 33 2 g/dL      RDW 13 5 %      MPV 9 9 fL      Platelets 430 Thousands/uL      nRBC 0 /100 WBCs      Neutrophils Relative 75 %      Immat GRANS % 1 %      Lymphocytes Relative 18 %      Monocytes Relative 6 %      Eosinophils Relative 0 %      Basophils Relative 0 %      Neutrophils Absolute 8 12 Thousands/µL      Immature Grans Absolute 0 07 Thousand/uL      Lymphocytes Absolute 1 96 Thousands/µL      Monocytes Absolute 0 69 Thousand/µL      Eosinophils Absolute 0 03 Thousand/µL      Basophils Absolute 0 04 Thousands/µL                  No orders to display              Procedures  Procedures         ED Course  ED Course as of Jan 29 2243 Fri Jan 29, 2021 2026 Texted Dr Yvette Stack regarding patient work-up and possible transfer to [address] [place] [address] for fetal monitoring  2047 Spoke with DHARMESH Dominguez on call for Dr Pipe Gould at [address] [place] [address]  Bedside fetal heart tone, 140s-150s  Patient states that she is now feeling the baby move around more  Dr Nuris Menard   States that because there was no trauma to the abdomen and that patient is hemodynamically stable and now feeling the fetus move, patient can be discharged and follow-up with the clinic tomorrow or on Monday, February 1st   Patient can also drive to [facility] and be seen in the [facility] triage  Updated patient on conversation  Patient and boyfriend at bedside are comfortable with plan  2103 Reassessed patient  Reviewed labs and imaging  Plan for discharge with primary care provider and OBGYN follow-up for her symptoms and low potassium  Patient has Tylenol at home  Work note provided  Strict return to ER precautions discussed with and acknowledged by patient  MDM  Number of Diagnoses or Management Options  Hypokalemia:   Third trimester pregnancy:      Amount and/or Complexity of Data Reviewed  Clinical lab tests: reviewed and ordered  Tests in the radiology section of CPT®: reviewed and ordered  Tests in the medicine section of CPT®: ordered and reviewed  Obtain history from someone other than the patient: yes (Boyfriend at bedside)  Review and summarize past medical records: yes  Discuss the patient with other providers: yes (OBGYN, Dr Neida Torres)    Risk of Complications, Morbidity, and/or Mortality  Presenting problems: moderate  Diagnostic procedures: moderate  Management options: moderate    Patient Progress  Patient progress: stable      Disposition  Final diagnoses: Third trimester pregnancy   Hypokalemia     Time reflects when diagnosis was documented in both MDM as applicable and the Disposition within this note     Time User Action Codes Description Comment    1/29/2021  8:23 PM Sahil Dixon Add [Z34 93] Third trimester pregnancy     1/29/2021  9:09 PM Sahil Dixon Add [E87 6] Hypokalemia       ED Disposition     ED Disposition Condition Date/Time Comment    Discharge Stable Fri Jan 29, 2021  9:09 PM         Follow-up Information     Follow up With Specialties Details Why Corina Castaneda MD Obstetrics and Gynecology, Obstetrics, Gynecology Call in 1 day Please follow-up with Dr Marcello Garcia OBGEORGEN within the next 1-2 days   [address] [facility]  [phone]            Discharge Medication List as of 1/29/2021  9:10 PM      CONTINUE these medications which have NOT CHANGED    Details   aspirin 81 mg chewable tablet Chew 2 tablets (162 mg total) daily, Starting Tue 10/6/2020, Until Mon 1/4/2021, Normal      nitrofurantoin (MACRODANTIN) 50 mg capsule Take 1 capsule (50 mg total) by mouth daily at bedtime, Starting Fri 11/20/2020, Normal      omeprazole (PriLOSEC OTC) 20 MG tablet Take 1 tablet (20 mg total) by mouth daily, Starting Fri 12/18/2020, Normal      omeprazole (PriLOSEC) 20 mg delayed release capsule Take 1 capsule (20 mg total) by mouth daily, Starting Fri 1/22/2021, Normal      Prenatal Vit-Fe Fumarate-FA (Prenatal Vitamin) 27-0 8 MG TABS Take 1 capsule by mouth daily, Starting Wed 8/26/2020, Normal      terconazole (TERAZOL 7) 0 4 % vaginal cream Insert 1 applicator into the vagina daily at bedtime, Starting Fri 10/23/2020, Normal           No discharge procedures on file      PDMP Review     None          ED Provider  Electronically Signed by    Cheri Lesch, MD Asa Hanly, MD  01/29/21 8335

## 2021-02-10 ENCOUNTER — TELEPHONE (OUTPATIENT)
Dept: OBGYN CLINIC | Facility: CLINIC | Age: 31
End: 2021-02-10

## 2021-02-10 ENCOUNTER — TELEPHONE (OUTPATIENT)
Dept: LAB | Facility: HOSPITAL | Age: 31
End: 2021-02-10

## 2021-02-10 DIAGNOSIS — Z3A.27 27 WEEKS GESTATION OF PREGNANCY: Primary | ICD-10-CM

## 2021-02-15 ENCOUNTER — TELEPHONE (OUTPATIENT)
Dept: OBGYN CLINIC | Facility: CLINIC | Age: 31
End: 2021-02-15

## 2021-02-15 NOTE — TELEPHONE ENCOUNTER
Called patient about canceled appointment schedule, CHRISTIANO to [facility] to inform us if she would prefer to continue care or if she is seeking care elsewhere    Requested patient call back and sent My Chart message as well

## 2021-02-15 NOTE — TELEPHONE ENCOUNTER
Pt returned call to office  Reports that she did not cancel her upcoming appointments as indicated by the computer which reported that her appointments were canceled 9/16/20 @0401 AM    Rescheduled patient for the remainder of her pregnancy so she can plan for transportation as she does not have a car  Pt also communicated that she has the mobile lab set up to collect her bloodwork on 2/22/21

## 2021-02-22 ENCOUNTER — APPOINTMENT (OUTPATIENT)
Dept: LAB | Facility: HOSPITAL | Age: 31
End: 2021-02-22
Payer: COMMERCIAL

## 2021-02-22 DIAGNOSIS — Z3A.27 27 WEEKS GESTATION OF PREGNANCY: ICD-10-CM

## 2021-02-22 LAB
ERYTHROCYTE [DISTWIDTH] IN BLOOD BY AUTOMATED COUNT: 13.6 % (ref 11.6–15.1)
HCT VFR BLD AUTO: 31.9 % (ref 34.8–46.1)
HGB BLD-MCNC: 10.1 G/DL (ref 11.5–15.4)
MCH RBC QN AUTO: 27.7 PG (ref 26.8–34.3)
MCHC RBC AUTO-ENTMCNC: 31.7 G/DL (ref 31.4–37.4)
MCV RBC AUTO: 88 FL (ref 82–98)
PLATELET # BLD AUTO: 265 THOUSANDS/UL (ref 149–390)
PMV BLD AUTO: 10.7 FL (ref 8.9–12.7)
RBC # BLD AUTO: 3.64 MILLION/UL (ref 3.81–5.12)
WBC # BLD AUTO: 8.65 THOUSAND/UL (ref 4.31–10.16)

## 2021-02-22 PROCEDURE — 87086 URINE CULTURE/COLONY COUNT: CPT

## 2021-02-22 PROCEDURE — 85027 COMPLETE CBC AUTOMATED: CPT

## 2021-02-22 PROCEDURE — 86592 SYPHILIS TEST NON-TREP QUAL: CPT

## 2021-02-22 PROCEDURE — 36415 COLL VENOUS BLD VENIPUNCTURE: CPT

## 2021-02-23 LAB
BACTERIA UR CULT: NORMAL
RPR SER QL: NORMAL

## 2021-03-12 ENCOUNTER — ROUTINE PRENATAL (OUTPATIENT)
Dept: OBGYN CLINIC | Facility: CLINIC | Age: 31
End: 2021-03-12
Payer: COMMERCIAL

## 2021-03-12 VITALS — BODY MASS INDEX: 42.89 KG/M2 | DIASTOLIC BLOOD PRESSURE: 62 MMHG | SYSTOLIC BLOOD PRESSURE: 120 MMHG | WEIGHT: 219.6 LBS

## 2021-03-12 DIAGNOSIS — E66.01 MATERNAL MORBID OBESITY IN THIRD TRIMESTER, ANTEPARTUM (HCC): ICD-10-CM

## 2021-03-12 DIAGNOSIS — O99.013 ANEMIA AFFECTING PREGNANCY IN THIRD TRIMESTER: Primary | ICD-10-CM

## 2021-03-12 DIAGNOSIS — O09.93 HIGH-RISK PREGNANCY IN THIRD TRIMESTER: ICD-10-CM

## 2021-03-12 DIAGNOSIS — Z3A.34 34 WEEKS GESTATION OF PREGNANCY: ICD-10-CM

## 2021-03-12 DIAGNOSIS — Z98.891 HISTORY OF CESAREAN SECTION: ICD-10-CM

## 2021-03-12 DIAGNOSIS — O99.213 MATERNAL MORBID OBESITY IN THIRD TRIMESTER, ANTEPARTUM (HCC): ICD-10-CM

## 2021-03-12 LAB
SL AMB  POCT GLUCOSE, UA: ABNORMAL
SL AMB POCT URINE PROTEIN: ABNORMAL

## 2021-03-12 PROCEDURE — 81002 URINALYSIS NONAUTO W/O SCOPE: CPT | Performed by: OBSTETRICS & GYNECOLOGY

## 2021-03-12 PROCEDURE — 99214 OFFICE O/P EST MOD 30 MIN: CPT | Performed by: OBSTETRICS & GYNECOLOGY

## 2021-03-12 RX ORDER — FERROUS SULFATE TAB EC 324 MG (65 MG FE EQUIVALENT) 324 (65 FE) MG
324 TABLET DELAYED RESPONSE ORAL
Qty: 60 TABLET | Refills: 3 | Status: SHIPPED | OUTPATIENT
Start: 2021-03-12 | End: 2021-03-26 | Stop reason: ALTCHOICE

## 2021-03-12 NOTE — PROGRESS NOTES
IUP at 34 weeks and 4 days  History of  section x1, reason not clear by her description sounds like failure to progress  She does not desire a TOLAC and requesting repeat   She has not completed her anatomic survey which was started her 20 week ultrasound recommend follow-up with MFM at her earliest convenience  For follow-up growth and missed anatomy  She has not been seen here since 27 weeks and 4 days  Blood pressure is normal her urine dips negative and +1 protein  Follow-up here in 2 weeks and p r n  scheduled follow-up growth ultrasound  Reviewed signs of  labor and kick counts  She is taking 1 baby aspirin daily  And prenatal vitamins and Prilosec every day  For heartburn and indigestion occasional vomiting  Repeat 1 hour Glucola was never completed  Follow-up CBC, anemia    Ordered iron tablets

## 2021-03-23 ENCOUNTER — ULTRASOUND (OUTPATIENT)
Dept: PERINATAL CARE | Facility: OTHER | Age: 31
End: 2021-03-23
Payer: COMMERCIAL

## 2021-03-23 VITALS
HEART RATE: 86 BPM | BODY MASS INDEX: 44.37 KG/M2 | DIASTOLIC BLOOD PRESSURE: 82 MMHG | WEIGHT: 226 LBS | SYSTOLIC BLOOD PRESSURE: 125 MMHG | HEIGHT: 60 IN

## 2021-03-23 DIAGNOSIS — Z98.891 HISTORY OF CESAREAN SECTION: ICD-10-CM

## 2021-03-23 DIAGNOSIS — Z3A.36 36 WEEKS GESTATION OF PREGNANCY: Primary | ICD-10-CM

## 2021-03-23 DIAGNOSIS — E66.9 OBESITY (BMI 30-39.9): ICD-10-CM

## 2021-03-23 DIAGNOSIS — Z72.0 TOBACCO ABUSE: ICD-10-CM

## 2021-03-23 PROCEDURE — 99213 OFFICE O/P EST LOW 20 MIN: CPT | Performed by: OBSTETRICS & GYNECOLOGY

## 2021-03-23 PROCEDURE — 76816 OB US FOLLOW-UP PER FETUS: CPT | Performed by: OBSTETRICS & GYNECOLOGY

## 2021-03-23 NOTE — LETTER
2021     [facility],   [address] [facility] [address]    Patient: Becka Rg   YOB: 1990   Date of Visit: 3/23/2021       Dear Dr Ward Moon: Thank you for referring Curt Peterson to me for evaluation  Below are my notes for this consultation  If you have questions, please do not hesitate to call me  I look forward to following your patient along with you  Sincerely,        Vanessa Uribe MD        CC: No Recipients  Vaenssa Uribe MD  3/23/2021  3:00 PM  Sign when Signing Visit  Ob ultrasound and MFM follow up    Ms Ray Aden is a 28 yo  patient  Obesity  Fetal ultrasound at 36 1/7   weeks gestation  to evaluate growth  See Ob procedures in EPIC  Ultrasound:  1  Fetal size = dates  EFW= 6lb 8 oz  = 635 with AC at the 93%  2  No fetal anomalies observed  3  Normal EDWARD  I reviewed the results of this ultrasound and answered  all the questions  Recommendations:      1  Follow-up ultrasound as clinically indicated      2  Once x week NST     3  Daily fetal movement counts  The total time spent on this established patient on the encounter date was 15 minutes  This time included previsit service time of 5 minutes, face-to-face  service time of 10 minutes discussing the results of the ultrasound, medical history, findings and recomendations, and post-service time of 5 minutes  Thank you for referring your patient to our offices  The limitations of ultrasound to detect all anomalies was reviewed and how it is not  a test to rule out aneuploidy  If you have any further questions do not hesitate to contact us as [phone]       Vanessa Uribe MD

## 2021-03-23 NOTE — PROGRESS NOTES
Ob ultrasound and MFM follow up    Ms Gina Graff is a 28 yo  patient  Obesity  Fetal ultrasound at 36 1/7   weeks gestation  to evaluate growth  See Ob procedures in EPIC  Ultrasound:  1  Fetal size = dates  EFW= 6lb 8 oz  = 635 with AC at the 93%  2  No fetal anomalies observed  3  Normal EDWARD  I reviewed the results of this ultrasound and answered  all the questions  Recommendations:      1  Follow-up ultrasound as clinically indicated      2  Once x week NST     3  Daily fetal movement counts  The total time spent on this established patient on the encounter date was 15 minutes  This time included previsit service time of 5 minutes, face-to-face  service time of 5 minutes discussing the results of the ultrasound, medical history, findings and recomendations, and post-service time of 5 minutes  Thank you for referring your patient to our offices  The limitations of ultrasound to detect all anomalies was reviewed and how it is not  a test to rule out aneuploidy  If you have any further questions do not hesitate to contact us as [phone]       Veronica Henley MD

## 2021-03-26 ENCOUNTER — HOSPITAL ENCOUNTER (EMERGENCY)
Facility: HOSPITAL | Age: 31
Discharge: LEFT BEFORE DISCHARGE | End: 2021-03-26
Attending: EMERGENCY MEDICINE | Admitting: EMERGENCY MEDICINE
Payer: COMMERCIAL

## 2021-03-26 VITALS
TEMPERATURE: 98.1 F | HEIGHT: 60 IN | HEART RATE: 99 BPM | DIASTOLIC BLOOD PRESSURE: 81 MMHG | SYSTOLIC BLOOD PRESSURE: 133 MMHG | WEIGHT: 225.97 LBS | OXYGEN SATURATION: 98 % | RESPIRATION RATE: 18 BRPM | BODY MASS INDEX: 44.36 KG/M2

## 2021-03-26 DIAGNOSIS — O36.8130 DECREASED FETAL MOVEMENTS IN THIRD TRIMESTER, SINGLE OR UNSPECIFIED FETUS: Primary | ICD-10-CM

## 2021-03-26 PROCEDURE — 99283 EMERGENCY DEPT VISIT LOW MDM: CPT

## 2021-03-26 PROCEDURE — 76815 OB US LIMITED FETUS(S): CPT | Performed by: EMERGENCY MEDICINE

## 2021-03-26 PROCEDURE — 99284 EMERGENCY DEPT VISIT MOD MDM: CPT | Performed by: EMERGENCY MEDICINE

## 2021-03-26 NOTE — ED PROVIDER NOTES
History  Chief Complaint   Patient presents with    Pregnancy Problem     pt 36 wks pregnant stating she has not felt baby move for at least 3 hours-last felt movement around 0500  denies trauma/injury     Patient is a 59-year-old female who reports that she is a G2 para 1001 having last given birth via  who reports that she is approximately 36 weeks gestation and was recently seen this week and had ultrasound performed by her Ob reporting today that she has not felt the baby move for about 3 hours and had been advised to be aware of this  Patient reports for evaluation of this issue  Patient denies any chest pain or shortness of breath  Patient reports that she is having some discomfort of the skin overlying her lower abdomen  Denies any fevers or chills or rashes  She does also report some lower extremity swelling and edema  She denies vaginal discharge or complaints  She does report some dysuria for which she reports she has been previously on antibiotics  She does report however that is reoccurring at this time  History provided by:  Patient  Pregnancy Problem  Quality: Not feeling baby move  Onset quality:  Sudden  Duration:  3 hours  Timing:  Constant  Progression:  Unable to specify  Gestational age:  39  Prenatal care:  Regular prenatal care  Associated symptoms: abdominal pain and dysuria    Associated symptoms: no back pain, no dizziness, no fever, no nausea and no vaginal discharge        Prior to Admission Medications   Prescriptions Last Dose Informant Patient Reported? Taking?    Prenatal Vit-Fe Fumarate-FA (Prenatal Vitamin) 27-0 8 MG TABS 3/25/2021 at Unknown time Self No Yes   Sig: Take 1 capsule by mouth daily   omeprazole (PriLOSEC OTC) 20 MG tablet 3/25/2021 at Unknown time Self No Yes   Sig: Take 1 tablet (20 mg total) by mouth daily      Facility-Administered Medications: None       Past Medical History:   Diagnosis Date    Abnormal Pap smear of cervix     Anemia     CTS (carpal tunnel syndrome)     Depression     Fetal macrosomia     Human papilloma virus (HPV) infection     Last Assessed: 2013    Migraine     Mild cervical dysplasia     Opio[facility] [address] [facility]     Pap Smear (+) Low Grade Squamous Intraepithelial Lesion 2013    with colposcopy Dec  2013 with negative biopsies except for parakeratosis    Postcoital bleeding     Last Assessed: 2013    Substance abuse ([facility] [address] [facility]     previous    Urinary tract infection     Varicella     both vaccine and chicken pox       Past Surgical History:   Procedure Laterality Date     SECTION  2010    primary low transverse  for arrest of dilation at term on 7/9/10  Patient had live male Apgars  with weight 9 lbs  1 oz   COLPOSCOPY W/ BIOPSY / CURETTAGE  2013    12/3/13 patient with colposcopy wtih ECCs ad biopsies at 12  All biopsies were negative with parakeratosis at 12       Family History   Problem Relation Age of Onset    Cancer Family     Diabetes Family     Hypertension Family     Thyroid disease Family         Disorder    Other Family         Back pain    Completed Suicide  Mother     No Known Problems Father      I have reviewed and agree with the history as documented  E-Cigarette/Vaping    E-Cigarette Use Never User      E-Cigarette/Vaping Substances    Nicotine No     THC No     CBD No     Flavoring No     Other No     Unknown No      Social History     Tobacco Use    Smoking status: Current Every Day Smoker     Packs/day: 1 00     Types: Cigarettes    Smokeless tobacco: Never Used   Substance Use Topics    Alcohol use: Not Currently    Drug use: Not Currently     Types: Heroin, Methamphetamines       Review of Systems   Constitutional: Negative for activity change, diaphoresis and fever  HENT: Negative for congestion, ear pain, rhinorrhea, sinus pressure and sore throat  Eyes: Negative      Respiratory: Negative for cough, chest tightness, shortness of breath and wheezing  Cardiovascular: Negative for chest pain, palpitations and leg swelling  Gastrointestinal: Positive for abdominal pain  Negative for diarrhea, nausea and vomiting  Endocrine: Negative  Negative for polyuria  Genitourinary: Positive for dysuria and frequency  Negative for decreased urine volume, flank pain, pelvic pain, urgency, vaginal bleeding, vaginal discharge and vaginal pain  Musculoskeletal: Negative for arthralgias, back pain and myalgias  Skin: Negative for pallor and rash  Allergic/Immunologic: Negative  Neurological: Negative for dizziness, weakness and headaches  Hematological: Negative  Psychiatric/Behavioral: Negative  All other systems reviewed and are negative  Physical Exam  Physical Exam  Vitals signs (Patient was frequently falling asleep during the exam ) and nursing note reviewed  Exam conducted with a chaperone present  Constitutional:       General: She is sleeping  Appearance: She is well-developed  Comments: Somewhat disheveled   HENT:      Head: Normocephalic and atraumatic  Right Ear: External ear normal       Left Ear: External ear normal       Nose: Nose normal       Mouth/Throat:      Mouth: Mucous membranes are moist       Pharynx: Oropharynx is clear  Eyes:      Pupils: Pupils are equal, round, and reactive to light  Neck:      Musculoskeletal: Normal range of motion and neck supple  Cardiovascular:      Rate and Rhythm: Normal rate and regular rhythm  Heart sounds: Normal heart sounds  No murmur  Pulmonary:      Effort: Pulmonary effort is normal  No respiratory distress  Breath sounds: Normal breath sounds  No stridor  No wheezing or rales  Chest:      Chest wall: No tenderness  Abdominal:      General: Abdomen is flat  There is no distension or abdominal bruit  There are no signs of injury  Palpations: Abdomen is soft  Tenderness:  There is abdominal tenderness in the suprapubic area  There is no right CVA tenderness, left CVA tenderness, guarding or rebound  Hernia: No hernia is present  Comments: Gravid uterus   Musculoskeletal: Normal range of motion  General: Swelling and tenderness present  No deformity or signs of injury  Right lower leg: Edema present  Left lower leg: Edema present  Skin:     General: Skin is warm and dry  Capillary Refill: Capillary refill takes less than 2 seconds  Coloration: Skin is not jaundiced or pale  Findings: No erythema, lesion or rash  Neurological:      General: No focal deficit present  Mental Status: She is oriented to person, place, and time and easily aroused  Mental status is at baseline  Cranial Nerves: No cranial nerve deficit     Psychiatric:         Mood and Affect: Mood normal          Vital Signs  ED Triage Vitals [03/26/21 0844]   Temperature Pulse Respirations Blood Pressure SpO2   98 1 °F (36 7 °C) 96 18 135/80 99 %      Temp Source Heart Rate Source Patient Position - Orthostatic VS BP Location FiO2 (%)   Temporal Monitor Lying Left arm --      Pain Score       --           Vitals:    03/26/21 0844 03/26/21 0845   BP: 135/80 133/81   Pulse: 96 99   Patient Position - Orthostatic VS: Lying          Visual Acuity      ED Medications  Medications - No data to display    Diagnostic Studies  Results Reviewed     None                 No orders to display              Procedures  POC Pelvic US    Date/Time: 3/26/2021 9:49 AM  Performed by: Lolly Bales DO  Authorized by: Lolly Bales DO     Patient location:  ED  Other Assisting Provider: No    Procedure details:     Exam Type:  Diagnostic    Indications: pregnant with no fetal heart sounds      Assessment for: evaluate fetal viability      Technique:  Transabdominal obstetric (HCG+) exam    Views obtained: left adnexa, right adnexa and uterus (transverse and sagittal)      Image quality: diagnostic      Image availability:  Images available in PACS  Uterine findings:     Intrauterine pregnancy: identified      Single gestation: identified      Fetal heart rate: identified    Interpretation:     Pregnancy findings: intrauterine pregnancy (IUP)    Comments:      -151  Fetal movement noted on exam             ED Course  ED Course as of Mar 26 1053   Fri Mar 26, 2021   7958 FHT by me at bedside 147  Fetal movement noted      0923 After I performed evaluation on the patient and was preparing to enter orders on my workstation I noticed the patient had got herself dressed and was leaving the emergency department  I did question the patient will was wrong and she said "nothing! I just need to leave!"    She then found an exit and let herself from the emergency room  I did attempt to express to her that she was welcome to return at any time                                              MDM  Number of Diagnoses or Management Options  Diagnosis management comments: Plan was to obtain blood work and urinalysis on patient  Also was going to attempt to perform more formal ultrasound  Had discussed these plans with the patient and gone to my computer to enter orders  As I was entering orders I noticed the patient had dressed and was leaving the department  I asked her if there was something that was wrong are there was any issue which she responded that she just needed to go  Patient was invited to return with any concerns or worsening  Patient would not stay long enough to advise her of any potential discharge plan      Risk of Complications, Morbidity, and/or Mortality  Presenting problems: high  Diagnostic procedures: moderate  Management options: moderate    Patient Progress  Patient progress: stable      Disposition  Final diagnoses:   Decreased fetal movements in third trimester, single or unspecified fetus     Time reflects when diagnosis was documented in both MDM as applicable and the Disposition within this note     Time User Action Codes Description Comment    3/26/2021 10:52 AM Stephanie Hebert Add [G21 0831] Decreased fetal movements in third trimester, single or unspecified fetus       ED Disposition     ED Disposition Condition Date/Time Comment    Eloped  Fri Mar 26, 2021  9:25 AM       Follow-up Information    None         Discharge Medication List as of 3/26/2021  9:29 AM      CONTINUE these medications which have NOT CHANGED    Details   omeprazole (PriLOSEC OTC) 20 MG tablet Take 1 tablet (20 mg total) by mouth daily, Starting Fri 12/18/2020, Normal      Prenatal Vit-Fe Fumarate-FA (Prenatal Vitamin) 27-0 8 MG TABS Take 1 capsule by mouth daily, Starting Wed 8/26/2020, Normal           No discharge procedures on file      PDMP Review     None          ED Provider  Electronically Signed by           Sangeeta Eaton DO  03/26/21 2746

## 2021-03-26 NOTE — ED NOTES
Patient came out of the room and asked which way was out and I asked her why, she stated because she "hates this shit and I don't want to be here anymore " patient encouraged to stay for further observation and evaluation and she continued to walk out despite our efforts to treat her        Marlon Boudreaux RN  03/26/21 2435

## 2021-05-26 ENCOUNTER — OFFICE VISIT (OUTPATIENT)
Dept: URGENT CARE | Facility: CLINIC | Age: 31
End: 2021-05-26
Payer: COMMERCIAL

## 2021-05-26 VITALS
HEART RATE: 100 BPM | WEIGHT: 192 LBS | OXYGEN SATURATION: 99 % | RESPIRATION RATE: 18 BRPM | SYSTOLIC BLOOD PRESSURE: 121 MMHG | DIASTOLIC BLOOD PRESSURE: 56 MMHG | HEIGHT: 60 IN | BODY MASS INDEX: 37.69 KG/M2 | TEMPERATURE: 98.4 F

## 2021-05-26 DIAGNOSIS — R60.0 BILATERAL LEG EDEMA: Primary | ICD-10-CM

## 2021-05-26 PROCEDURE — 99203 OFFICE O/P NEW LOW 30 MIN: CPT | Performed by: PHYSICIAN ASSISTANT

## 2021-05-26 NOTE — PATIENT INSTRUCTIONS
Patient is currently 8 weeks postpartum  Bilateral leg swelling for the last 2 weeks  Recommend patient go to the emergency room for further evaluation  She states that her ride is going to have to come pick her up and take her to the ER  She is not exactly sure which emergency room she is going to go to   But states that she will go  Emergency room was not called to notify because patient is unsure where she is going to go, she is going to make that decision when her right picks her up

## 2021-05-26 NOTE — PROGRESS NOTES
[facility]    NAME: Ronnie Chapa is a 27 y o  female  : 1990    MRN: [id number]  DATE: May 26, 2021  TIME: 2:11 PM    Assessment and Plan   Bilateral leg edema [R60 0]  1  Bilateral leg edema         Patient Instructions     Patient Instructions     Patient is currently 8 weeks postpartum  Bilateral leg swelling for the last 2 weeks  Recommend patient go to the emergency room for further evaluation  She states that her ride is going to have to come pick her up and take her to the ER  She is not exactly sure which emergency room she is going to go to   But states that she will go  Emergency room was not called to notify because patient is unsure where she is going to go, she is going to make that decision when her right picks her up  Chief Complaint     Chief Complaint   Patient presents with    Leg Swelling     c/o lower leg swelling x2 weeks       History of Present Illness    27-year-old female here with complaint of bilateral leg swelling  Patient is currently 8 postpartum  States that the swelling has been there for the last 2 weeks  She states that she had the same type of swelling when she was pregnant but that had resolved  Reports that her feet hurt  She denies any shortness of breath  No chest pain  Review of Systems   Review of Systems   Constitutional: Negative for appetite change, chills and fever  HENT: Negative for congestion, ear discharge, ear pain, facial swelling, postnasal drip, sinus pressure, sneezing and sore throat  Respiratory: Negative for cough, shortness of breath and wheezing  Cardiovascular: Positive for leg swelling  Negative for chest pain and palpitations  Neurological: Negative for headaches         Current Medications     Current Outpatient Medications:     omeprazole (PriLOSEC OTC) 20 MG tablet, Take 1 tablet (20 mg total) by mouth daily (Patient not taking: Reported on 2021), Disp: 30 tablet, Rfl: 6    Prenatal Vit-Fe Fumarate-FA (Prenatal Vitamin) 27-0 8 MG TABS, Take 1 capsule by mouth daily (Patient not taking: Reported on 2021), Disp: 90 tablet, Rfl: 4    Current Allergies     Allergies as of 2021    (No Known Allergies)          The following portions of the patient's history were reviewed and updated as appropriate: allergies, current medications, past family history, past medical history, past social history, past surgical history and problem list    Past Medical History:   Diagnosis Date    Abnormal Pap smear of cervix     Anemia     CTS (carpal tunnel syndrome)     Depression     Fetal macrosomia     Human papilloma virus (HPV) infection     Last Assessed: 2013    Migraine     Mild cervical dysplasia     [facility]     Pap Smear (+) Low Grade Squamous Intraepithelial Lesion 2013    with colposcopy Dec  2013 with negative biopsies except for parakeratosis    Postcoital bleeding     Last Assessed: 2013    Sub[facility] abuse ([facility] )     previous    Urinary tract infection     Varicella     both vaccine and chicken pox     Past Surgical History:   Procedure Laterality Date     SECTION  2010    primary low transverse  for arrest of dilation at term on 7/9/10  Patient had live male Apgars /9 with weight 9 lbs  1 oz   COLPOSCOPY W/ BIOPSY / CURETTAGE  2013    12/3/13 patient with colposcopy wtih ECCs ad biopsies at 12    All biopsies were negative with parakeratosis at 12     Family History   Problem Relation Age of Onset    Cancer Family     Diabetes Family     Hypertension Family     Thyroid disease Family         Disorder    Other Family         Back pain    Completed Suicide  Mother     No Known Problems Father      Social History     Socioeconomic History    Marital status: Single     Spouse name: Not on file    Number of children: 1    Years of education: Not on file    Highest education level: Not on file Occupational History    Not on file   Social Needs    Financial resource strain: Not on file    Food insecurity     Worry: Not on file     Inability: Not on file    Transportation needs     Medical: Not on file     Non-medical: Not on file   Tobacco Use    Smoking status: Current Every Day Smoker     Packs/day: 1 00     Types: Cigarettes    Smokeless tobacco: Never Used   Substance and Sexual Activity    Alcohol use: Not Currently    Drug use: Not Currently     Types: Heroin, Methamphetamines    Sexual activity: Yes     Partners: Male   Lifestyle    Physical activity     Days per week: Not on file     Minutes per session: Not on file    Stress: Not on file   Relationships    Social connections     Talks on phone: Not on file     Gets together: Not on file     Attends Latter day service: Not on file     Active member of club or organization: Not on file     Attends meetings of clubs or organizations: Not on file     Relationship status: Not on file    Intimate partner violence     Fear of current or ex partner: Not on file     Emotionally abused: Not on file     Physically abused: Not on file     Forced sexual activity: Not on file   Other Topics Concern    Not on file   Social History Narrative    Caffeine use    Zoroastrianism affiliation none    Uses safety equipment - seatbelts     Medications have been verified  Objective   /56   Pulse 100   Temp 98 4 °F (36 9 °C)   Resp 18   Ht 5' (1 524 m)   Wt 87 1 kg (192 lb)   LMP 07/13/2020   SpO2 99%   Breastfeeding Unknown   BMI 37 50 kg/m²      Physical Exam   Physical Exam  Vitals signs and nursing note reviewed  Constitutional:       General: She is not in acute distress  Appearance: She is well-developed  HENT:      Head: Normocephalic and atraumatic  Right Ear: Tympanic membrane normal       Left Ear: Tympanic membrane normal       Nose: Nose normal  No mucosal edema or rhinorrhea        Right Sinus: No maxillary sinus tenderness or frontal sinus tenderness  Left Sinus: No maxillary sinus tenderness or frontal sinus tenderness  Mouth/Throat:      Pharynx: No oropharyngeal exudate or posterior oropharyngeal erythema  Eyes:      Conjunctiva/sclera: Conjunctivae normal    Cardiovascular:      Rate and Rhythm: Normal rate and regular rhythm  Heart sounds: Normal heart sounds  No murmur  Pulmonary:      Effort: Pulmonary effort is normal       Breath sounds: Normal breath sounds  Abdominal:      General: Abdomen is flat  Musculoskeletal:         General: Swelling (+3 pitting edema bilateral lower legs) present

## 2022-08-17 ENCOUNTER — OFFICE VISIT (OUTPATIENT)
Dept: OBGYN CLINIC | Facility: MEDICAL CENTER | Age: 32
End: 2022-08-17
Payer: COMMERCIAL

## 2022-08-17 VITALS
DIASTOLIC BLOOD PRESSURE: 85 MMHG | BODY MASS INDEX: 38.28 KG/M2 | SYSTOLIC BLOOD PRESSURE: 120 MMHG | HEIGHT: 60 IN | WEIGHT: 195 LBS

## 2022-08-17 DIAGNOSIS — N75.0 CYST OF RIGHT BARTHOLIN'S GLAND: Primary | ICD-10-CM

## 2022-08-17 PROCEDURE — 99203 OFFICE O/P NEW LOW 30 MIN: CPT | Performed by: OBSTETRICS & GYNECOLOGY

## 2022-08-17 PROCEDURE — 87070 CULTURE OTHR SPECIMN AEROBIC: CPT | Performed by: OBSTETRICS & GYNECOLOGY

## 2022-08-17 PROCEDURE — 56420 I&D BARTHOLINS GLAND ABSCESS: CPT | Performed by: OBSTETRICS & GYNECOLOGY

## 2022-08-17 PROCEDURE — 87186 SC STD MICRODIL/AGAR DIL: CPT | Performed by: OBSTETRICS & GYNECOLOGY

## 2022-08-17 PROCEDURE — 87077 CULTURE AEROBIC IDENTIFY: CPT | Performed by: OBSTETRICS & GYNECOLOGY

## 2022-08-17 NOTE — PROGRESS NOTES
Assessment Roberto was seen today for vaginal cyst     Diagnoses and all orders for this visit:    Cyst of right Bartholin's gland  -     Genital Comprehensive Culture         Plan  I&D performed of Bartholin's cyst   Genital culture taken  Pt to f/u in 10 days  Recommendations were communicated to pt's guards  Subjective   Jhoan Lang is a 32 y o  female here for a problem visit  Pt is currently incarcerated and was accompanied by two guards  Patient is complaining of feeling sore  Sx's started 5 days ago  Patient reports she thinks she has a cyst  Pt reports having one in the past which drained on its own  No menses this past month  Pt reports that her menses are normally regular  Pt had a daughter 17 months ago  Pt was arrested 22  Pt had a menses in July  Last SA in   Patient Active Problem List   Diagnosis    Anxiety    ASCUS with positive high risk HPV cervical    Cannabis abuse    Carpal tunnel syndrome    Depression    Disc degeneration, lumbar    Esophageal reflux    Herniated nucleus pulposus, L4-5    Herniation of lumbar intervertebral disc    Intractable chronic migraine without aura and with status migrainosus    Low back pain    Sciatica    Neck pain, chronic    Obesity (BMI 30-39  9)    Pain in joint involving ankle and foot    Plantar fascial fibromatosis    Right lumbar radiculopathy    Thoracic or lumbosacral neuritis or radiculitis    Sacroiliitis (HCC)    Secondary amenorrhea    Tobacco abuse    Vaginitis due to Candida albicans    Tobacco smoking affecting pregnancy in first trimester    Acute cystitis without hematuria    Urinary tract infection in mother during pregnancy, antepartum    GERD (gastroesophageal reflux disease)    36 weeks gestation of pregnancy    History of  section       Gynecologic History  Patient's last menstrual period was 2022  The current method of family planning is none      Past Medical History:   Diagnosis Date    Abnormal Pap smear of cervix     Anemia     CTS (carpal tunnel syndrome)     Depression     Fetal macrosomia     Human papilloma virus (HPV) infection     Last Assessed: 2013    Migraine     Mild cervical dysplasia     [facility] [address] [facility]     Pap Smear (+) Low Grade Squamous Intraepithelial Lesion 2013    with colposcopy Dec  2013 with negative biopsies except for parakeratosis    Postcoital bleeding     Last Assessed: 2013    [facility]     [facility]    Urinary tract infection     Varicella     both vaccine and chicken pox     Past Surgical History:   Procedure Laterality Date     SECTION  2010    primary low transverse  for arrest of dilation at term on 7/9/10  Patient had live male Apgars  with weight 9 lbs  1 oz   COLPOSCOPY W/ BIOPSY / CURETTAGE  2013    12/3/13 patient with colposcopy wtih ECCs ad biopsies at 12    All biopsies were negative with parakeratosis at 12     Family History   Problem Relation Age of Onset    Cancer Family     Diabetes Family     Hypertension Family     Thyroid disease Family         Disorder    Other Family         Back pain    Completed Suicide  Mother     No Known Problems Father      Social History     Socioeconomic History    Marital status: Single     Spouse name: Not on file    Number of children: 3    Years of education: Not on file    Highest education level: Not on file   Occupational History    Not on file   Tobacco Use    Smoking status: Current Every Day Smoker     Packs/day: 1 00     Types: Cigarettes    Smokeless tobacco: Never Used   Vaping Use    Vaping Use: Never used   Substance and Sexual Activity    Alcohol use: Not Currently    Drug use: Not Currently     Types: Heroin, Methamphetamines    Sexual activity: Yes     Partners: Male   Other Topics Concern    Not on file   Social History Narrative    Caffeine use    [employer] affiliation none    Uses safety equipment - seatbelts     Social Determinants of Health     Financial Resource Strain: Not on file   Food Insecurity: Not on file   Transportation Needs: Not on file   Physical Activity: Not on file   Stress: Not on file   Social Connections: Not on file   Intimate Partner Violence: Not on file   Housing Stability: Not on file     No Known Allergies  No current outpatient medications on file  Review of Systems  Constitutional :no fever, feels well, no tiredness, no recent weight gain or loss  ENT: no ear ache, no loss of hearing, no nosebleeds or nasal discharge, no sore throat or hoarseness  Cardiovascular: no complaints of slow or fast heart beat, no chest pain, no palpitations, no leg claudication or lower extremity edema  Respiratory: no complaints of shortness of shortness of breath, no MOSLEY  Breasts:no complaints of breast pain, breast lump, or nipple discharge  Gastrointestinal: no complaints of abdominal pain, constipation, nausea, vomiting, or diarrhea or bloody stools  Genitourinary : no complaints of dysuria, incontinence, pelvic pain, no dysmenorrhea, vaginal discharge or abnormal vaginal bleeding and as noted in HPI  Musculoskeletal: no complaints of arthralgia, no myalgia, no joint swelling or stiffness, no limb pain or swelling  Integumentary: no complaints of skin rash or lesion, itching or dry skin  Neurological: no complaints of headache, no confusion, no numbness or tingling, no dizziness or fainting     Objective     /85   Ht 5' (1 524 m)   Wt 88 5 kg (195 lb)   LMP 07/01/2022   BMI 38 08 kg/m²     General Appears stated age, cooperative, alert normal mood and affect   Psychiatric oriented to person, place and time  Mood and affect normal   Vulva: 3 cm right sided Bartholin's cyst noted, tender to exam  No opening or head     Incision and drain    Date/Time: 8/17/2022 11:50 AM  Performed by: Bryce Galloway MD  Authorized by:  Bryce Galloway MD   Universal Protocol:  Consent: Written consent obtained  Risks and benefits: risks, benefits and alternatives were discussed  Consent given by: patient  Time out: Immediately prior to procedure a "time out" was called to verify the correct patient, procedure, equipment, support staff and site/side marked as required  Patient understanding: patient states understanding of the procedure being performed  Patient consent: the patient's understanding of the procedure matches consent given      Patient location:  Clinic  Location:     Type:  Bartholin cyst    Size:  3 cm    Location:  Anogenital    Anogenital location:  Bartholin's gland  Pre-procedure details:     Skin preparation:  Betadine  Anesthesia (see MAR for exact dosages): Anesthesia method:  Local infiltration    Local anesthetic:  Lidocaine 1% w/o epi  Procedure details:     Complexity:  Simple    Needle aspiration: no      Incision types:  Stab incision    Scalpel blade:  11    Approach:  Puncture    Incision depth:  Subcutaneous    Drainage:  Serosanguinous    Drainage amount:  Scant    Wound treatment:  Wound left open  Comments:      Approximately 3 cc of 1% lidocaine used in total   Pt report she has a needle phobia  Area tested and pt reported feeling numb  Attempt to make an incision with the knife  Pt reports feeling sharp  Lidocaine injected a few more times but pt kept reporting feeling pain and moving up exam table  Small incision was ultimately made with the scalpel ~1-2 mm in length  Pt did not tolerate probing with the swab and did not tolerate attempts to express the cyst   Small amt was expressed and genital culture taken of the fluid

## 2022-08-21 LAB
BACTERIA GENITAL AEROBE CULT: ABNORMAL
BACTERIA GENITAL AEROBE CULT: ABNORMAL

## 2022-08-24 ENCOUNTER — OFFICE VISIT (OUTPATIENT)
Dept: OBGYN CLINIC | Facility: MEDICAL CENTER | Age: 32
End: 2022-08-24
Payer: COMMERCIAL

## 2022-08-24 VITALS
SYSTOLIC BLOOD PRESSURE: 120 MMHG | HEIGHT: 60 IN | WEIGHT: 195 LBS | DIASTOLIC BLOOD PRESSURE: 80 MMHG | BODY MASS INDEX: 38.28 KG/M2

## 2022-08-24 DIAGNOSIS — N75.0 CYST OF RIGHT BARTHOLIN'S GLAND: Primary | ICD-10-CM

## 2022-08-24 PROCEDURE — 99024 POSTOP FOLLOW-UP VISIT: CPT | Performed by: OBSTETRICS & GYNECOLOGY

## 2022-08-24 PROCEDURE — 99213 OFFICE O/P EST LOW 20 MIN: CPT | Performed by: OBSTETRICS & GYNECOLOGY

## 2022-08-24 RX ORDER — SULFAMETHOXAZOLE AND TRIMETHOPRIM 800; 160 MG/1; MG/1
1 TABLET ORAL EVERY 12 HOURS SCHEDULED
Qty: 20 TABLET | Refills: 0 | Status: SHIPPED | OUTPATIENT
Start: 2022-08-24 | End: 2022-09-03

## 2022-08-24 NOTE — PROGRESS NOTES
Assessment Roberto was seen today for follow-up  Diagnoses and all orders for this visit:    Cyst of right Bartholin's gland  -     sulfamethoxazole-trimethoprim (BACTRIM DS) 800-160 mg per tablet; Take 1 tablet by mouth every 12 (twelve) hours for 10 days         Plan  Patient and guard counseled that the right Bartholin's cyst appears to have mostly resolved  It will continue to heal   In the meantime patient to start Bactrim DS p o  B i d  For 10 days  Patient to follow up with any further questions or concerns  Ruth Carcamo is a 32 y o  female here for a f/u visit  Pt is incarcerated and is accompanied by a female guard in the room  Patient in guarded reports that the day after the cyst popped  Patient is feeling better  No issues with ambulation  Has not noticed any further drainage  States that the pain has resolved  Patient Active Problem List   Diagnosis    Anxiety    ASCUS with positive high risk HPV cervical    Cannabis abuse    Carpal tunnel syndrome    Depression    Disc degeneration, lumbar    Esophageal reflux    Herniated nucleus pulposus, L4-5    Herniation of lumbar intervertebral disc    Intractable chronic migraine without aura and with status migrainosus    Low back pain    Sciatica    Neck pain, chronic    Obesity (BMI 30-39  9)    Pain in joint involving ankle and foot    Plantar fascial fibromatosis    Right lumbar radiculopathy    Thoracic or lumbosacral neuritis or radiculitis    Sacroiliitis (HCC)    Secondary amenorrhea    Tobacco abuse    Vaginitis due to Candida albicans    Tobacco smoking affecting pregnancy in first trimester    Acute cystitis without hematuria    Urinary tract infection in mother during pregnancy, antepartum    GERD (gastroesophageal reflux disease)    36 weeks gestation of pregnancy    History of  section       Gynecologic History  No LMP recorded    The current method of family planning is none     Past Medical History:   Diagnosis Date    Abnormal Pap smear of cervix     Anemia     CTS (carpal tunnel syndrome)     Depression     Fetal macrosomia     Human papilloma virus (HPV) infection     Last Assessed: 2013    Migraine     Mild cervical dysplasia     [facility]     Pap Smear (+) Low Grade Squamous Intraepithelial Lesion 2013    with colposcopy Dec  2013 with negative biopsies except for parakeratosis    Postcoital bleeding     Last Assessed: 2013    [facility]     previous    Urinary tract infection     Varicella     both vaccine and chicken pox     Past Surgical History:   Procedure Laterality Date     SECTION  2010    primary low transverse  for arrest of dilation at term on 7/9/10  Patient had live male Apgars  with weight 9 lbs  1 oz   COLPOSCOPY W/ BIOPSY / CURETTAGE  2013    12/3/13 patient with colposcopy wtih ECCs ad biopsies at 12    All biopsies were negative with parakeratosis at 12     Family History   Problem Relation Age of Onset    Cancer Family     Diabetes Family     Hypertension Family     Thyroid disease Family         Disorder    Other Family         Back pain    Completed Suicide  Mother     No Known Problems Father      Social History     Socioeconomic History    Marital status: Single     Spouse name: Not on file    Number of children: 3    Years of education: Not on file    Highest education level: Not on file   Occupational History    Not on file   Tobacco Use    Smoking status: Current Every Day Smoker     Packs/day: 1 00     Types: Cigarettes    Smokeless tobacco: Never Used   Vaping Use    Vaping Use: Never used   Substance and Sexual Activity    Alcohol use: Not Currently    Drug use: Not Currently     Types: Heroin, Methamphetamines    Sexual activity: Yes     Partners: Male   Other Topics Concern    Not on file   Social History Narrative Caffeine use    Gnosticism affiliation none    Uses safety equipment - seatbelts     Social Determinants of Health     Financial Resource Strain: Not on file   Food Insecurity: Not on file   Transportation Needs: Not on file   Physical Activity: Not on file   Stress: Not on file   Social Connections: Not on file   Intimate Partner Violence: Not on file   Housing Stability: Not on file     No Known Allergies    Current Outpatient Medications:     sulfamethoxazole-trimethoprim (BACTRIM DS) 800-160 mg per tablet, Take 1 tablet by mouth every 12 (twelve) hours for 10 days, Disp: 20 tablet, Rfl: 0    Review of Systems  Constitutional :no fever, feels well, no tiredness, no recent weight gain or loss  ENT: no ear ache, no loss of hearing, no nosebleeds or nasal discharge, no sore throat or hoarseness  Cardiovascular: no complaints of slow or fast heart beat, no chest pain, no palpitations, no leg claudication or lower extremity edema  Respiratory: no complaints of shortness of shortness of breath, no MOSLEY  Breasts:no complaints of breast pain, breast lump, or nipple discharge  Gastrointestinal: no complaints of abdominal pain, constipation, nausea, vomiting, or diarrhea or bloody stools  Genitourinary : no complaints of dysuria, incontinence, pelvic pain, no dysmenorrhea, vaginal discharge or abnormal vaginal bleeding and as noted in HPI  Musculoskeletal: no complaints of arthralgia, no myalgia, no joint swelling or stiffness, no limb pain or swelling  Integumentary: no complaints of skin rash or lesion, itching or dry skin  Neurological: no complaints of headache, no confusion, no numbness or tingling, no dizziness or fainting     Objective     /80 (BP Location: Right arm)   Ht 5' (1 524 m)   Wt 88 5 kg (195 lb)   BMI 38 08 kg/m²     General Appears stated age, cooperative, alert normal mood and affect   Psychiatric oriented to person, place and time    Mood and affect normal   Vulva: Right Bartholin's cyst significantly decompressed from prior visit  Area of I&D appears healed  No further fluid collections palpated  Nontender in the area  Some slight swelling noted

## 2022-10-12 PROBLEM — O23.40 URINARY TRACT INFECTION IN MOTHER DURING PREGNANCY, ANTEPARTUM: Status: RESOLVED | Noted: 2020-12-18 | Resolved: 2022-10-12
